# Patient Record
Sex: FEMALE | Race: WHITE | NOT HISPANIC OR LATINO | Employment: FULL TIME | URBAN - NONMETROPOLITAN AREA
[De-identification: names, ages, dates, MRNs, and addresses within clinical notes are randomized per-mention and may not be internally consistent; named-entity substitution may affect disease eponyms.]

---

## 2017-05-30 ENCOUNTER — TRANSCRIBE ORDERS (OUTPATIENT)
Dept: ADMINISTRATIVE | Facility: HOSPITAL | Age: 47
End: 2017-05-30

## 2017-05-30 DIAGNOSIS — Z12.31 ENCOUNTER FOR SCREENING MAMMOGRAM FOR MALIGNANT NEOPLASM OF BREAST: Primary | ICD-10-CM

## 2017-06-15 ENCOUNTER — HOSPITAL ENCOUNTER (OUTPATIENT)
Dept: MAMMOGRAPHY | Facility: HOSPITAL | Age: 47
Discharge: HOME | End: 2017-06-15
Admitting: OBSTETRICS & GYNECOLOGY

## 2017-06-15 DIAGNOSIS — Z12.31 ENCOUNTER FOR SCREENING MAMMOGRAM FOR MALIGNANT NEOPLASM OF BREAST: ICD-10-CM

## 2017-06-15 PROCEDURE — 77063 BREAST TOMOSYNTHESIS BI: CPT

## 2017-06-15 PROCEDURE — G0202 SCR MAMMO BI INCL CAD: HCPCS

## 2018-07-10 ENCOUNTER — TRANSCRIBE ORDERS (OUTPATIENT)
Dept: ADMINISTRATIVE | Facility: HOSPITAL | Age: 48
End: 2018-07-10

## 2018-07-10 DIAGNOSIS — Z12.31 ENCOUNTER FOR SCREENING MAMMOGRAM FOR MALIGNANT NEOPLASM OF BREAST: ICD-10-CM

## 2018-07-10 DIAGNOSIS — N28.1 RENAL CYST: Primary | ICD-10-CM

## 2018-07-27 ENCOUNTER — HOSPITAL ENCOUNTER (OUTPATIENT)
Dept: MAMMOGRAPHY | Facility: HOSPITAL | Age: 48
Discharge: HOME | End: 2018-07-27
Admitting: OBSTETRICS & GYNECOLOGY

## 2018-07-27 ENCOUNTER — HOSPITAL ENCOUNTER (OUTPATIENT)
Dept: CT IMAGING | Facility: HOSPITAL | Age: 48
Discharge: HOME | End: 2018-07-27

## 2018-07-27 DIAGNOSIS — Z12.31 ENCOUNTER FOR SCREENING MAMMOGRAM FOR MALIGNANT NEOPLASM OF BREAST: ICD-10-CM

## 2018-07-27 DIAGNOSIS — N28.1 RENAL CYST: ICD-10-CM

## 2018-07-27 LAB — CREAT BLDA-MCNC: 0.7 MG/DL (ref 0.6–1.3)

## 2018-07-27 PROCEDURE — 77067 SCR MAMMO BI INCL CAD: CPT

## 2018-07-27 PROCEDURE — 25010000002 IOPAMIDOL 61 % SOLUTION: Performed by: OBSTETRICS & GYNECOLOGY

## 2018-07-27 PROCEDURE — 77063 BREAST TOMOSYNTHESIS BI: CPT

## 2018-07-27 PROCEDURE — 82565 ASSAY OF CREATININE: CPT

## 2018-07-27 PROCEDURE — 74170 CT ABD WO CNTRST FLWD CNTRST: CPT

## 2018-07-27 RX ADMIN — IOPAMIDOL 100 ML: 612 INJECTION, SOLUTION INTRAVENOUS at 08:38

## 2018-08-01 ENCOUNTER — TRANSCRIBE ORDERS (OUTPATIENT)
Dept: ADMINISTRATIVE | Facility: HOSPITAL | Age: 48
End: 2018-08-01

## 2018-08-01 DIAGNOSIS — N63.0 BREAST MASS: Primary | ICD-10-CM

## 2018-08-03 ENCOUNTER — HOSPITAL ENCOUNTER (OUTPATIENT)
Dept: MAMMOGRAPHY | Facility: HOSPITAL | Age: 48
Discharge: HOME | End: 2018-08-03
Admitting: OBSTETRICS & GYNECOLOGY

## 2018-08-03 ENCOUNTER — HOSPITAL ENCOUNTER (OUTPATIENT)
Dept: ULTRASOUND IMAGING | Facility: HOSPITAL | Age: 48
Discharge: HOME | End: 2018-08-03

## 2018-08-03 DIAGNOSIS — N63.0 BREAST MASS: ICD-10-CM

## 2018-08-03 PROCEDURE — 77065 DX MAMMO INCL CAD UNI: CPT

## 2018-08-03 PROCEDURE — G0279 TOMOSYNTHESIS, MAMMO: HCPCS

## 2018-08-03 PROCEDURE — 76642 ULTRASOUND BREAST LIMITED: CPT

## 2018-11-15 RX ORDER — MONTELUKAST SODIUM 10 MG/1
TABLET ORAL
Qty: 30 TABLET | Refills: 0 | Status: SHIPPED | OUTPATIENT
Start: 2018-11-15 | End: 2018-12-16 | Stop reason: SDUPTHER

## 2018-12-03 ENCOUNTER — TELEPHONE (OUTPATIENT)
Dept: FAMILY MEDICINE CLINIC | Facility: CLINIC | Age: 48
End: 2018-12-03

## 2018-12-03 RX ORDER — TRAZODONE HYDROCHLORIDE 50 MG/1
TABLET ORAL
Qty: 15 TABLET | Refills: 3 | Status: SHIPPED | OUTPATIENT
Start: 2018-12-03 | End: 2019-04-06 | Stop reason: SDUPTHER

## 2018-12-03 NOTE — TELEPHONE ENCOUNTER
Pt needs refill on Naltrexone sent to [facility] pharmacy in [place].    Please and thank you.

## 2018-12-17 ENCOUNTER — OFFICE VISIT (OUTPATIENT)
Dept: FAMILY MEDICINE CLINIC | Facility: CLINIC | Age: 48
End: 2018-12-17

## 2018-12-17 VITALS
OXYGEN SATURATION: 97 % | BODY MASS INDEX: 27.88 KG/M2 | HEART RATE: 77 BPM | SYSTOLIC BLOOD PRESSURE: 118 MMHG | HEIGHT: 66 IN | DIASTOLIC BLOOD PRESSURE: 68 MMHG | TEMPERATURE: 97.3 F | WEIGHT: 173.5 LBS

## 2018-12-17 DIAGNOSIS — J40 BRONCHITIS: Primary | ICD-10-CM

## 2018-12-17 DIAGNOSIS — J01.00 ACUTE NON-RECURRENT MAXILLARY SINUSITIS: ICD-10-CM

## 2018-12-17 PROCEDURE — 99214 OFFICE O/P EST MOD 30 MIN: CPT | Performed by: FAMILY MEDICINE

## 2018-12-17 PROCEDURE — 96372 THER/PROPH/DIAG INJ SC/IM: CPT | Performed by: FAMILY MEDICINE

## 2018-12-17 RX ORDER — MONTELUKAST SODIUM 10 MG/1
TABLET ORAL
Qty: 30 TABLET | Refills: 0 | Status: SHIPPED | OUTPATIENT
Start: 2018-12-17 | End: 2019-01-13 | Stop reason: SDUPTHER

## 2018-12-17 RX ORDER — DESLORATADINE 5 MG/1
5 TABLET ORAL DAILY
Qty: 30 TABLET | Refills: 1 | Status: SHIPPED | OUTPATIENT
Start: 2018-12-17 | End: 2019-06-25

## 2018-12-17 RX ORDER — PREDNISONE 10 MG/1
TABLET ORAL
Refills: 0 | COMMUNITY
Start: 2018-11-02 | End: 2018-12-17

## 2018-12-17 RX ORDER — INFLUENZA A VIRUS A/MICHIGAN/45/2015 X-275 (H1N1) ANTIGEN (FORMALDEHYDE INACTIVATED), INFLUENZA A VIRUS A/SINGAPORE/INFIMH-16-0019/2016 IVR-186 (H3N2) ANTIGEN (FORMALDEHYDE INACTIVATED), INFLUENZA B VIRUS B/PHUKET/3073/2013 ANTIGEN (FORMALDEHYDE INACTIVATED), AND INFLUENZA B VIRUS B/MARYLAND/15/2016 BX-69A ANTIGEN (FORMALDEHYDE INACTIVATED) 15; 15; 15; 15 UG/.5ML; UG/.5ML; UG/.5ML; UG/.5ML
INJECTION, SUSPENSION INTRAMUSCULAR
Refills: 0 | COMMUNITY
Start: 2018-11-24 | End: 2019-10-04

## 2018-12-17 RX ORDER — MONTELUKAST SODIUM 10 MG/1
TABLET ORAL
COMMUNITY
Start: 2018-05-15 | End: 2019-10-04 | Stop reason: SDUPTHER

## 2018-12-17 RX ORDER — PROMETHAZINE HCL/CODEINE 6.25-10/5
5 SYRUP ORAL EVERY 4 HOURS PRN
Qty: 240 ML | Refills: 0 | Status: SHIPPED | OUTPATIENT
Start: 2018-12-17 | End: 2019-10-04

## 2018-12-17 RX ORDER — AZITHROMYCIN 250 MG/1
TABLET, FILM COATED ORAL
Refills: 0 | COMMUNITY
Start: 2018-11-02 | End: 2018-12-17

## 2018-12-17 RX ORDER — IPRATROPIUM BROMIDE AND ALBUTEROL SULFATE 2.5; .5 MG/3ML; MG/3ML
SOLUTION RESPIRATORY (INHALATION)
Refills: 0 | COMMUNITY
Start: 2018-11-09 | End: 2019-10-04 | Stop reason: SDUPTHER

## 2018-12-17 RX ORDER — CEFTRIAXONE 500 MG/1
500 INJECTION, POWDER, FOR SOLUTION INTRAMUSCULAR; INTRAVENOUS EVERY 24 HOURS
Status: COMPLETED | OUTPATIENT
Start: 2018-12-17 | End: 2018-12-17

## 2018-12-17 RX ORDER — AZITHROMYCIN 250 MG/1
TABLET, FILM COATED ORAL
Qty: 6 TABLET | Refills: 0 | Status: SHIPPED | OUTPATIENT
Start: 2018-12-17 | End: 2019-10-04

## 2018-12-17 RX ORDER — BROMPHENIRAMINE MALEATE, PSEUDOEPHEDRINE HYDROCHLORIDE, AND DEXTROMETHORPHAN HYDROBROMIDE 2; 30; 10 MG/5ML; MG/5ML; MG/5ML
SYRUP ORAL
Refills: 0 | COMMUNITY
Start: 2018-11-09 | End: 2019-06-25

## 2018-12-17 RX ADMIN — CEFTRIAXONE 500 MG: 500 INJECTION, POWDER, FOR SOLUTION INTRAMUSCULAR; INTRAVENOUS at 16:40

## 2018-12-17 NOTE — PATIENT INSTRUCTIONS
Acute Bronchitis, Adult  Acute bronchitis is when air tubes (bronchi) in the lungs suddenly get swollen. The condition can make it hard to breathe. It can also cause these symptoms:  · A cough.  · Coughing up clear, yellow, or green mucus.  · Wheezing.  · Chest congestion.  · Shortness of breath.  · A fever.  · Body aches.  · Chills.  · A sore throat.    Follow these instructions at home:  Medicines  · Take over-the-counter and prescription medicines only as told by your doctor.  · If you were prescribed an antibiotic medicine, take it as told by your doctor. Do not stop taking the antibiotic even if you start to feel better.  General instructions  · Rest.  · Drink enough fluids to keep your pee (urine) clear or pale yellow.  · Avoid smoking and secondhand smoke. If you smoke and you need help quitting, ask your doctor. Quitting will help your lungs heal faster.  · Use an inhaler, cool mist vaporizer, or humidifier as told by your doctor.  · Keep all follow-up visits as told by your doctor. This is important.  How is this prevented?  To lower your risk of getting this condition again:  · Wash your hands often with soap and water. If you cannot use soap and water, use hand .  · Avoid contact with people who have cold symptoms.  · Try not to touch your hands to your mouth, nose, or eyes.  · Make sure to get the flu shot every year.    Contact a doctor if:  · Your symptoms do not get better in 2 weeks.  Get help right away if:  · You cough up blood.  · You have chest pain.  · You have very bad shortness of breath.  · You become dehydrated.  · You faint (pass out) or keep feeling like you are going to pass out.  · You keep throwing up (vomiting).  · You have a very bad headache.  · Your fever or chills gets worse.  This information is not intended to replace advice given to you by your health care provider. Make sure you discuss any questions you have with your health care provider.  Document Released:  06/05/2009 Document Revised: 07/26/2017 Document Reviewed: 06/07/2017  [employer] Interactive Patient Education © 2018 [employer]  Sinusitis, Adult  Sinusitis is soreness and inflammation of your sinuses. Sinuses are hollow spaces in the bones around your face. Your sinuses are located:  · Around your eyes.  · In the middle of your forehead.  · Behind your nose.  · In your cheekbones.    Your sinuses and nasal passages are lined with a stringy fluid (mucus). Mucus normally drains out of your sinuses. When your nasal tissues become inflamed or swollen, the mucus can become trapped or blocked so air cannot flow through your sinuses. This allows bacteria, viruses, and funguses to grow, which leads to infection.  Sinusitis can develop quickly and last for 7?10 days (acute) or for more than 12 weeks (chronic). Sinusitis often develops after a cold.  What are the causes?  This condition is caused by anything that creates swelling in the sinuses or stops mucus from draining, including:  · Allergies.  · Asthma.  · Bacterial or viral infection.  · Abnormally shaped bones between the nasal passages.  · Nasal growths that contain mucus (nasal polyps).  · Narrow sinus openings.  · Pollutants, such as chemicals or irritants in the air.  · A foreign object stuck in the nose.  · A fungal infection. This is rare.    What increases the risk?  The following factors may make you more likely to develop this condition:  · Having allergies or asthma.  · Having had a recent cold or respiratory tract infection.  · Having structural deformities or blockages in your nose or sinuses.  · Having a weak immune system.  · Doing a lot of swimming or diving.  · Overusing nasal sprays.  · Smoking.    What are the signs or symptoms?  The main symptoms of this condition are pain and a feeling of pressure around the affected sinuses. Other symptoms include:  · Upper toothache.  · Earache.  · Headache.  · Bad breath.  · Decreased sense of smell and  taste.  · A cough that may get worse at night.  · Fatigue.  · Fever.  · Thick drainage from your nose. The drainage is often green and it may contain pus (purulent).  · Stuffy nose or congestion.  · Postnasal drip. This is when extra mucus collects in the throat or back of the nose.  · Swelling and warmth over the affected sinuses.  · Sore throat.  · Sensitivity to light.    How is this diagnosed?  This condition is diagnosed based on symptoms, a medical history, and a physical exam. To find out if your condition is acute or chronic, your health care provider may:  · Look in your nose for signs of nasal polyps.  · Tap over the affected sinus to check for signs of infection.  · View the inside of your sinuses using an imaging device that has a light attached (endoscope).    If your health care provider suspects that you have chronic sinusitis, you may also:  · Be tested for allergies.  · Have a sample of mucus taken from your nose (nasal culture) and checked for bacteria.  · Have a mucus sample examined to see if your sinusitis is related to an allergy.    If your sinusitis does not respond to treatment and it lasts longer than 8 weeks, you may have an MRI or CT scan to check your sinuses. These scans also help to determine how severe your infection is.  In rare cases, a bone biopsy may be done to rule out more serious types of fungal sinus disease.  How is this treated?  Treatment for sinusitis depends on the cause and whether your condition is chronic or acute. If a virus is causing your sinusitis, your symptoms will go away on their own within 10 days. You may be given medicines to relieve your symptoms, including:  · Topical nasal decongestants. They shrink swollen nasal passages and let mucus drain from your sinuses.  · Antihistamines. These drugs block inflammation that is triggered by allergies. This can help to ease swelling in your nose and sinuses.  · Topical nasal corticosteroids. These are nasal sprays  that ease inflammation and swelling in your nose and sinuses.  · Nasal saline washes. These rinses can help to get rid of thick mucus in your nose.    If your condition is caused by bacteria, you will be given an antibiotic medicine. If your condition is caused by a fungus, you will be given an antifungal medicine.  Surgery may be needed to correct underlying conditions, such as narrow nasal passages. Surgery may also be needed to remove polyps.  Follow these instructions at home:  Medicines  · Take, use, or apply over-the-counter and prescription medicines only as told by your health care provider. These may include nasal sprays.  · If you were prescribed an antibiotic medicine, take it as told by your health care provider. Do not stop taking the antibiotic even if you start to feel better.  Hydrate and Humidify  · Drink enough water to keep your urine clear or pale yellow. Staying hydrated will help to thin your mucus.  · Use a cool mist humidifier to keep the humidity level in your home above 50%.  · Inhale steam for 10-15 minutes, 3-4 times a day or as told by your health care provider. You can do this in the bathroom while a hot shower is running.  · Limit your exposure to cool or dry air.  Rest  · Rest as much as possible.  · Sleep with your head raised (elevated).  · Make sure to get enough sleep each night.  General instructions  · Apply a warm, moist washcloth to your face 3-4 times a day or as told by your health care provider. This will help with discomfort.  · Wash your hands often with soap and water to reduce your exposure to viruses and other germs. If soap and water are not available, use hand .  · Do not smoke. Avoid being around people who are smoking (secondhand smoke).  · Keep all follow-up visits as told by your health care provider. This is important.  Contact a health care provider if:  · You have a fever.  · Your symptoms get worse.  · Your symptoms do not improve within 10  days.  Get help right away if:  · You have a severe headache.  · You have persistent vomiting.  · You have pain or swelling around your face or eyes.  · You have vision problems.  · You develop confusion.  · Your neck is stiff.  · You have trouble breathing.  This information is not intended to replace advice given to you by your health care provider. Make sure you discuss any questions you have with your health care provider.  Document Released: 12/18/2006 Document Revised: 08/13/2017 Document Reviewed: 10/12/2016  [employer] Interactive Patient Education © 2018 [employer]

## 2018-12-20 NOTE — PROGRESS NOTES
OFFICE VISIT NOTE:    Abi Tamez is a 48 y.o. female who presents today for URI (x 5 days) and Cough (w/ gagging).     URI    This is a new problem. The current episode started in the past 7 days. The problem has been gradually worsening. There has been no fever. Associated symptoms include coughing, headaches, rhinorrhea, sinus pain and a sore throat. Pertinent negatives include no abdominal pain, chest pain or rash. She has tried acetaminophen and increased fluids for the symptoms. The treatment provided mild relief.   Cough   This is a new problem. The current episode started in the past 7 days. The problem has been gradually worsening. The problem occurs every few hours. The cough is productive of purulent sputum. Associated symptoms include headaches, nasal congestion, postnasal drip, rhinorrhea and a sore throat. Pertinent negatives include no chest pain, fever, hemoptysis, rash or shortness of breath. Nothing aggravates the symptoms. She has tried nothing for the symptoms. The treatment provided mild relief. There is no history of COPD or pneumonia.        Past medical/surgical history, Family history, Social history, Allergies and Medications have been reviewed with the patient today and are updated in [facility] EMR. See below.    Past Medical History:   Diagnosis Date   • Depression    • Seasonal allergies      Past Surgical History:   Procedure Laterality Date   • HYSTERECTOMY     • OOPHORECTOMY       Family History   Problem Relation Age of Onset   • Breast cancer Paternal Grandmother      Social History     Tobacco Use   • Smoking status: Never Smoker   • Smokeless tobacco: Never Used   Substance Use Topics   • Alcohol use: No     Frequency: Never   • Drug use: No       Allergies:  Patient has no known allergies.      Review of Systems:    Review of Systems   Constitutional: Negative for activity change, appetite change, fatigue and fever.   HENT: Positive for postnasal drip, rhinorrhea and sore throat.   "  Respiratory: Positive for cough. Negative for hemoptysis and shortness of breath.    Cardiovascular: Negative for chest pain.   Gastrointestinal: Negative for abdominal pain.   Skin: Negative for rash.   Neurological: Negative for syncope and headache.         Physical Examination:  Vital Signs:  /68 (BP Location: Left arm, Patient Position: Sitting, Cuff Size: Adult)   Pulse 77   Temp 97.3 °F (36.3 °C)   Ht 167.6 cm (66\")   Wt 78.7 kg (173 lb 8 oz)   SpO2 97%   BMI 28.00 kg/m²   Physical Exam   Constitutional: She is oriented to person, place, and time. She appears well-developed and well-nourished. No distress.   HENT:   Head: Normocephalic and atraumatic.   Right Ear: External ear normal.   Left Ear: External ear normal.   Mouth/Throat: Oropharynx is clear and moist.   Eyes: Conjunctivae and EOM are normal.   Neck: Normal range of motion. Neck supple. No JVD present.   Cardiovascular: Normal rate, regular rhythm, normal heart sounds and intact distal pulses.   Pulmonary/Chest: Effort normal and breath sounds normal. No respiratory distress.   Abdominal: Soft. She exhibits no distension. There is no tenderness.   Musculoskeletal: Normal range of motion. She exhibits no edema.   Neurological: She is alert and oriented to person, place, and time. No cranial nerve deficit.   Skin: Skin is warm and dry. Capillary refill takes less than 2 seconds. No rash noted.   Psychiatric: She has a normal mood and affect. Her behavior is normal.   Nursing note and vitals reviewed.      Procedures      ASSESSMENT/ PLAN:    Abi was seen today for uri and cough.    Diagnoses and all orders for this visit:    Bronchitis  -     azithromycin (ZITHROMAX) 250 MG tablet; Take 2 tablets the first day, then 1 tablet daily for 4 days.  -     promethazine-codeine (PHENERGAN with CODEINE) 6.25-10 MG/5ML syrup; Take 5 mL by mouth Every 4 (Four) Hours As Needed for Cough.  -     cefTRIAXone (ROCEPHIN) injection 500 mg; Inject 500 " mg into the appropriate muscle as directed by prescriber Daily.    Acute non-recurrent maxillary sinusitis  -     azithromycin (ZITHROMAX) 250 MG tablet; Take 2 tablets the first day, then 1 tablet daily for 4 days.  -     promethazine-codeine (PHENERGAN with CODEINE) 6.25-10 MG/5ML syrup; Take 5 mL by mouth Every 4 (Four) Hours As Needed for Cough.  -     desloratadine (CLARINEX) 5 MG tablet; Take 1 tablet by mouth Daily.  -     cefTRIAXone (ROCEPHIN) injection 500 mg; Inject 500 mg into the appropriate muscle as directed by prescriber Daily.          Current Outpatient Medications:   •  brompheniramine-pseudoephedrine-DM 30-2-10 MG/5ML syrup, TAKE 5 ML BY MOUTH EVERY 6 HOURS AS NEEDED, Disp: , Rfl: 0  •  Cholecalciferol 24231 units capsule, Take  by mouth., Disp: , Rfl:   •  fluticasone (FLONASE) 50 MCG/ACT nasal spray, 2 sprays into the nostril(s) as directed by provider Daily., Disp: , Rfl:   •  FLUZONE QUADRIVALENT 0.5 ML suspension prefilled syringe injection, TO BE ADMINISTERED BY PHARMACIST FOR IMMUNIZATION, Disp: , Rfl: 0  •  ipratropium-albuterol (DUO-NEB) 0.5-2.5 mg/3 ml nebulizer, INHALE 3 ML EVERY 6 HOURS, Disp: , Rfl: 0  •  montelukast (SINGULAIR) 10 MG tablet, TAKE 1 TABLET BY MOUTH EVERY DAY, Disp: 30 tablet, Rfl: 0  •  montelukast (SINGULAIR) 10 MG tablet, every evening., Disp: , Rfl:   •  NALTREXONE HCL PO, Take 4.5 mg by mouth Daily. From [facility] - at pharmacy recommendations., Disp: , Rfl:   •  Polyethylene Glycol 3350-GRX powder, Nightly as needed, Disp: , Rfl:   •  traZODone (DESYREL) 50 MG tablet, TAKE 1/2 TABLET BY MOUTH AT BEDTIME, Disp: 15 tablet, Rfl: 3  •  azithromycin (ZITHROMAX) 250 MG tablet, Take 2 tablets the first day, then 1 tablet daily for 4 days., Disp: 6 tablet, Rfl: 0  •  citalopram (CeleXA) 20 MG tablet, Take 20 mg by mouth Daily., Disp: , Rfl:   •  desloratadine (CLARINEX) 5 MG tablet, Take 1 tablet by mouth Daily., Disp: 30 tablet, Rfl: 1  •   promethazine-codeine (PHENERGAN with CODEINE) 6.25-10 MG/5ML syrup, Take 5 mL by mouth Every 4 (Four) Hours As Needed for Cough., Disp: 240 mL, Rfl: 0    FOLLOW-UP:    Return if symptoms worsen or fail to improve, for Recheck.    I discussed the patients findings and my recommendations with patient.  An After Visit Summary (AVS) was printed and given to the patient at discharge.    Femi Ramires MD, FAAFP  12/19/2018

## 2018-12-26 ENCOUNTER — TELEPHONE (OUTPATIENT)
Dept: FAMILY MEDICINE CLINIC | Facility: CLINIC | Age: 48
End: 2018-12-26

## 2018-12-26 DIAGNOSIS — J01.01 ACUTE RECURRENT MAXILLARY SINUSITIS: Primary | ICD-10-CM

## 2018-12-26 RX ORDER — AMOXICILLIN AND CLAVULANATE POTASSIUM 875; 125 MG/1; MG/1
1 TABLET, FILM COATED ORAL 2 TIMES DAILY
Qty: 20 TABLET | Refills: 0 | Status: SHIPPED | OUTPATIENT
Start: 2018-12-26 | End: 2019-06-25 | Stop reason: SDUPTHER

## 2018-12-26 RX ORDER — METHYLPREDNISOLONE 4 MG/1
TABLET ORAL
Qty: 21 EACH | Refills: 0 | Status: SHIPPED | OUTPATIENT
Start: 2018-12-26 | End: 2019-10-04

## 2018-12-26 NOTE — TELEPHONE ENCOUNTER
Patient called because she was seen in the office last week with sinus and congestion problems. She said she has not gotten any better but has gotten worse. She is supposed to finish her zpak on Friday. She is calling to see if there is anything else she can do or if she would need to be seen again?

## 2019-01-02 ENCOUNTER — TRANSCRIBE ORDERS (OUTPATIENT)
Dept: ADMINISTRATIVE | Facility: HOSPITAL | Age: 49
End: 2019-01-02

## 2019-01-02 DIAGNOSIS — Z00.00 ROUTINE ADULT HEALTH MAINTENANCE: Primary | ICD-10-CM

## 2019-01-14 RX ORDER — MONTELUKAST SODIUM 10 MG/1
TABLET ORAL
Qty: 30 TABLET | Refills: 0 | Status: SHIPPED | OUTPATIENT
Start: 2019-01-14 | End: 2019-02-11 | Stop reason: SDUPTHER

## 2019-01-21 ENCOUNTER — HOSPITAL ENCOUNTER (OUTPATIENT)
Dept: MAMMOGRAPHY | Facility: HOSPITAL | Age: 49
Discharge: HOME | End: 2019-01-21
Admitting: OBSTETRICS & GYNECOLOGY

## 2019-01-21 DIAGNOSIS — Z00.00 ROUTINE ADULT HEALTH MAINTENANCE: ICD-10-CM

## 2019-01-21 PROCEDURE — 77067 SCR MAMMO BI INCL CAD: CPT

## 2019-01-21 PROCEDURE — 77063 BREAST TOMOSYNTHESIS BI: CPT

## 2019-02-11 RX ORDER — MONTELUKAST SODIUM 10 MG/1
TABLET ORAL
Qty: 30 TABLET | Refills: 0 | Status: SHIPPED | OUTPATIENT
Start: 2019-02-11 | End: 2019-03-16 | Stop reason: SDUPTHER

## 2019-03-18 RX ORDER — MONTELUKAST SODIUM 10 MG/1
TABLET ORAL
Qty: 30 TABLET | Refills: 2 | Status: SHIPPED | OUTPATIENT
Start: 2019-03-18 | End: 2019-06-09 | Stop reason: SDUPTHER

## 2019-03-22 RX ORDER — FEXOFENADINE HCL AND PSEUDOEPHEDRINE HCI 180; 240 MG/1; MG/1
1 TABLET, EXTENDED RELEASE ORAL DAILY
Qty: 30 TABLET | Refills: 2 | Status: SHIPPED | OUTPATIENT
Start: 2019-03-22 | End: 2019-06-25 | Stop reason: SDUPTHER

## 2019-04-08 RX ORDER — TRAZODONE HYDROCHLORIDE 50 MG/1
TABLET ORAL
Qty: 15 TABLET | Refills: 3 | Status: SHIPPED | OUTPATIENT
Start: 2019-04-08 | End: 2019-09-06 | Stop reason: SDUPTHER

## 2019-04-29 ENCOUNTER — TELEPHONE (OUTPATIENT)
Dept: FAMILY MEDICINE CLINIC | Facility: CLINIC | Age: 49
End: 2019-04-29

## 2019-06-10 RX ORDER — MONTELUKAST SODIUM 10 MG/1
TABLET ORAL
Qty: 90 TABLET | Refills: 0 | Status: SHIPPED | OUTPATIENT
Start: 2019-06-10 | End: 2019-09-01 | Stop reason: SDUPTHER

## 2019-06-10 NOTE — TELEPHONE ENCOUNTER
Patient was last seen on 12.26.19. Pended 90 day supply with note to pharmacy that patient will need to be seen in office for any further refills.

## 2019-06-25 ENCOUNTER — TELEPHONE (OUTPATIENT)
Dept: FAMILY MEDICINE CLINIC | Facility: CLINIC | Age: 49
End: 2019-06-25

## 2019-06-25 DIAGNOSIS — J01.01 ACUTE RECURRENT MAXILLARY SINUSITIS: ICD-10-CM

## 2019-06-25 RX ORDER — FEXOFENADINE HCL AND PSEUDOEPHEDRINE HCI 180; 240 MG/1; MG/1
1 TABLET, EXTENDED RELEASE ORAL DAILY
Qty: 30 TABLET | Refills: 2 | Status: SHIPPED | OUTPATIENT
Start: 2019-06-25 | End: 2019-06-27 | Stop reason: DRUGHIGH

## 2019-06-25 RX ORDER — AMOXICILLIN AND CLAVULANATE POTASSIUM 875; 125 MG/1; MG/1
1 TABLET, FILM COATED ORAL 2 TIMES DAILY
Qty: 20 TABLET | Refills: 0 | Status: SHIPPED | OUTPATIENT
Start: 2019-06-25 | End: 2019-10-04

## 2019-06-25 NOTE — TELEPHONE ENCOUNTER
Pt called, requesting allergy medication and antibiotics to be called in. Pt would like this called in to [facility], Pt did not schedule appt.

## 2019-06-27 ENCOUNTER — TELEPHONE (OUTPATIENT)
Dept: FAMILY MEDICINE CLINIC | Facility: CLINIC | Age: 49
End: 2019-06-27

## 2019-06-27 RX ORDER — FEXOFENADINE HCL AND PSEUDOEPHEDRINE HCI 60; 120 MG/1; MG/1
1 TABLET, EXTENDED RELEASE ORAL 2 TIMES DAILY
Qty: 60 TABLET | Refills: 2 | Status: SHIPPED | OUTPATIENT
Start: 2019-06-27 | End: 2019-10-17

## 2019-09-03 RX ORDER — MONTELUKAST SODIUM 10 MG/1
TABLET ORAL
Qty: 90 TABLET | Refills: 3 | Status: SHIPPED | OUTPATIENT
Start: 2019-09-03

## 2019-09-06 RX ORDER — TRAZODONE HYDROCHLORIDE 50 MG/1
TABLET ORAL
Qty: 15 TABLET | Refills: 3 | Status: SHIPPED | OUTPATIENT
Start: 2019-09-06 | End: 2019-10-01 | Stop reason: SDUPTHER

## 2019-10-01 RX ORDER — TRAZODONE HYDROCHLORIDE 50 MG/1
50 TABLET ORAL
Qty: 30 TABLET | Refills: 2 | Status: SHIPPED | OUTPATIENT
Start: 2019-10-01 | End: 2019-12-22

## 2019-10-04 ENCOUNTER — OFFICE VISIT (OUTPATIENT)
Dept: FAMILY MEDICINE CLINIC | Facility: CLINIC | Age: 49
End: 2019-10-04

## 2019-10-04 VITALS
WEIGHT: 161.2 LBS | BODY MASS INDEX: 25.91 KG/M2 | HEIGHT: 66 IN | HEART RATE: 76 BPM | OXYGEN SATURATION: 97 % | TEMPERATURE: 98.9 F | DIASTOLIC BLOOD PRESSURE: 70 MMHG | RESPIRATION RATE: 20 BRPM | SYSTOLIC BLOOD PRESSURE: 111 MMHG

## 2019-10-04 DIAGNOSIS — J20.9 ACUTE BRONCHITIS, UNSPECIFIED ORGANISM: Primary | ICD-10-CM

## 2019-10-04 DIAGNOSIS — J02.9 ACUTE PHARYNGITIS, UNSPECIFIED ETIOLOGY: ICD-10-CM

## 2019-10-04 DIAGNOSIS — J01.00 ACUTE NON-RECURRENT MAXILLARY SINUSITIS: ICD-10-CM

## 2019-10-04 PROCEDURE — 99213 OFFICE O/P EST LOW 20 MIN: CPT | Performed by: NURSE PRACTITIONER

## 2019-10-04 PROCEDURE — 96372 THER/PROPH/DIAG INJ SC/IM: CPT | Performed by: NURSE PRACTITIONER

## 2019-10-04 RX ORDER — LEVOFLOXACIN 500 MG/1
500 TABLET, FILM COATED ORAL DAILY
Qty: 7 TABLET | Refills: 0 | Status: SHIPPED | OUTPATIENT
Start: 2019-10-04 | End: 2019-10-11

## 2019-10-04 RX ORDER — IPRATROPIUM BROMIDE AND ALBUTEROL SULFATE 2.5; .5 MG/3ML; MG/3ML
3 SOLUTION RESPIRATORY (INHALATION) 4 TIMES DAILY PRN
Qty: 120 ML | Refills: 2 | Status: SHIPPED | OUTPATIENT
Start: 2019-10-04

## 2019-10-04 RX ORDER — LEVOTHYROXINE SODIUM 0.07 MG/1
TABLET ORAL
Refills: 3 | COMMUNITY
Start: 2019-08-15 | End: 2019-12-17 | Stop reason: DRUGHIGH

## 2019-10-04 RX ORDER — METHYLPREDNISOLONE ACETATE 40 MG/ML
40 INJECTION, SUSPENSION INTRA-ARTICULAR; INTRALESIONAL; INTRAMUSCULAR; SOFT TISSUE ONCE
Status: COMPLETED | OUTPATIENT
Start: 2019-10-04 | End: 2019-10-04

## 2019-10-04 RX ADMIN — METHYLPREDNISOLONE ACETATE 40 MG: 40 INJECTION, SUSPENSION INTRA-ARTICULAR; INTRALESIONAL; INTRAMUSCULAR; SOFT TISSUE at 16:28

## 2019-10-04 RX ADMIN — METHYLPREDNISOLONE ACETATE 40 MG: 40 INJECTION, SUSPENSION INTRA-ARTICULAR; INTRALESIONAL; INTRAMUSCULAR; SOFT TISSUE at 16:29

## 2019-10-04 NOTE — PROGRESS NOTES
Chief Complaint   Patient presents with   • Cough   • Headache   • Chills        Subjective   Abi Tamez is a 49 y.o.  female who presents today for URI symptoms.    HPI:  URI SYMPTOMS:  Patient states her symptoms started about 1 week ago.  She has headache, chills and cough.  The cough is non-productive.  Cough is equal day and night.  The cough started 2 days ago, prior to that it was mainly head symptoms.    Abi Tamez  has a past medical history of Depression and Seasonal allergies.    No Known Allergies    Current Outpatient Medications:   •  Cholecalciferol 95332 units capsule, Take  by mouth., Disp: , Rfl:   •  fexofenadine-pseudoephedrine (ALLEGRA-D)  MG per 12 hr tablet, Take 1 tablet by mouth 2 (Two) Times a Day., Disp: 60 tablet, Rfl: 2  •  fluticasone (FLONASE) 50 MCG/ACT nasal spray, 2 sprays into the nostril(s) as directed by provider Daily., Disp: , Rfl:   •  ipratropium-albuterol (DUO-NEB) 0.5-2.5 mg/3 ml nebulizer, Take 3 mL by nebulization 4 (Four) Times a Day As Needed for Wheezing., Disp: 120 mL, Rfl: 2  •  levothyroxine (SYNTHROID, LEVOTHROID) 75 MCG tablet, TAKE 1 TABLET (75 MCG TOTAL) BY MOUTH DAILY. TAKE ON EMPTY STOMACH IN AM WITH WATER ONLY., Disp: , Rfl: 3  •  montelukast (SINGULAIR) 10 MG tablet, TAKE 1 TABLET BY MOUTH EVERY DAY, Disp: 90 tablet, Rfl: 3  •  NALTREXONE HCL PO, Take 4.5 mg by mouth Daily. From [facility] - at pharmacy recommendations., Disp: , Rfl:   •  Polyethylene Glycol 3350-GRX powder, Nightly as needed, Disp: , Rfl:   •  traZODone (DESYREL) 50 MG tablet, Take 1 tablet by mouth every night at bedtime., Disp: 30 tablet, Rfl: 2  •  levoFLOXacin (LEVAQUIN) 500 MG tablet, Take 1 tablet by mouth Daily for 7 days., Disp: 7 tablet, Rfl: 0  No current facility-administered medications for this visit.   Past Medical History:   Diagnosis Date   • Depression    • Seasonal allergies      Past Surgical History:   Procedure Laterality Date   •  HYSTERECTOMY     • OOPHORECTOMY       Social History     Socioeconomic History   • Marital status:      Spouse name: Not on file   • Number of children: Not on file   • Years of education: Not on file   • Highest education level: Not on file   Tobacco Use   • Smoking status: Never Smoker   • Smokeless tobacco: Never Used   Substance and Sexual Activity   • Alcohol use: No     Frequency: Never   • Drug use: No   • Sexual activity: Yes     Partners: Male     Family History   Problem Relation Age of Onset   • Breast cancer Paternal Grandmother    • No Known Problems Mother    • No Known Problems Father        Family history, surgical history, past medical history, Allergies and med's reviewed with patient today and updated in [facility] EMR.     ROS:  Review of Systems   Constitutional: Positive for chills and fatigue. Negative for fever and unexpected weight change.   HENT: Positive for congestion, postnasal drip, sinus pressure and sinus pain. Negative for facial swelling, sore throat and trouble swallowing.    Eyes: Negative.  Negative for photophobia, discharge and visual disturbance.   Respiratory: Positive for cough. Negative for chest tightness and shortness of breath.    Cardiovascular: Negative.  Negative for chest pain and palpitations.   Gastrointestinal: Negative.  Negative for abdominal pain, diarrhea, nausea and vomiting.   Endocrine: Negative.  Negative for polydipsia, polyphagia and polyuria.   Genitourinary: Negative.  Negative for dysuria, flank pain and frequency.   Musculoskeletal: Negative.  Negative for back pain, gait problem and neck pain.   Skin: Negative.  Negative for rash.   Allergic/Immunologic: Negative.    Neurological: Positive for headaches. Negative for dizziness and light-headedness.   Hematological: Negative.    Psychiatric/Behavioral: Negative.  Negative for self-injury and suicidal ideas.       OBJECTIVE:  Vitals:    10/04/19 1550   BP: 111/70   BP Location: Right arm   Patient  "Position: Sitting   Cuff Size: Adult   Pulse: 76   Resp: 20   Temp: 98.9 °F (37.2 °C)   TempSrc: Temporal   SpO2: 97%   Weight: 73.1 kg (161 lb 3.2 oz)   Height: 167.6 cm (66\")     Physical Exam   Constitutional: She is oriented to person, place, and time. She appears well-developed and well-nourished. No distress.   HENT:   Head: Normocephalic and atraumatic.   Eyes: Conjunctivae and EOM are normal. Pupils are equal, round, and reactive to light.   Neck: Normal range of motion. Neck supple.   Cardiovascular: Normal rate, regular rhythm, normal heart sounds and intact distal pulses.   No murmur heard.  Pulmonary/Chest: Effort normal and breath sounds normal. No respiratory distress.   Abdominal: Soft. Bowel sounds are normal. She exhibits no distension. There is no tenderness.   Musculoskeletal: Normal range of motion. She exhibits no edema.   Neurological: She is alert and oriented to person, place, and time.   Skin: Skin is warm and dry. Capillary refill takes less than 2 seconds. She is not diaphoretic. No erythema.   Psychiatric: She has a normal mood and affect. Her behavior is normal. Judgment and thought content normal.   Nursing note and vitals reviewed.      ASSESSMENT/ PLAN:    Abi was seen today for cough, headache and chills.    Diagnoses and all orders for this visit:    Acute bronchitis, unspecified organism  -     ipratropium-albuterol (DUO-NEB) 0.5-2.5 mg/3 ml nebulizer; Take 3 mL by nebulization 4 (Four) Times a Day As Needed for Wheezing.  -     methylPREDNISolone acetate (DEPO-medrol) injection 40 mg  -     methylPREDNISolone acetate (DEPO-medrol) injection 40 mg  -     levoFLOXacin (LEVAQUIN) 500 MG tablet; Take 1 tablet by mouth Daily for 7 days.    Acute pharyngitis, unspecified etiology  -     methylPREDNISolone acetate (DEPO-medrol) injection 40 mg  -     methylPREDNISolone acetate (DEPO-medrol) injection 40 mg  -     levoFLOXacin (LEVAQUIN) 500 MG tablet; Take 1 tablet by mouth Daily for 7 " days.    Acute non-recurrent maxillary sinusitis  -     methylPREDNISolone acetate (DEPO-medrol) injection 40 mg  -     methylPREDNISolone acetate (DEPO-medrol) injection 40 mg  -     levoFLOXacin (LEVAQUIN) 500 MG tablet; Take 1 tablet by mouth Daily for 7 days.        Orders Placed Today:     New Medications Ordered This Visit   Medications   • ipratropium-albuterol (DUO-NEB) 0.5-2.5 mg/3 ml nebulizer     Sig: Take 3 mL by nebulization 4 (Four) Times a Day As Needed for Wheezing.     Dispense:  120 mL     Refill:  2   • methylPREDNISolone acetate (DEPO-medrol) injection 40 mg   • methylPREDNISolone acetate (DEPO-medrol) injection 40 mg   • levoFLOXacin (LEVAQUIN) 500 MG tablet     Sig: Take 1 tablet by mouth Daily for 7 days.     Dispense:  7 tablet     Refill:  0        Management Plan:     An After Visit Summary was printed and given to the patient at discharge.    Follow-up: Return if symptoms worsen or fail to improve.    GIOVANNI Burrell 10/4/2019 4:54 PM  This note was electronically signed.

## 2019-10-08 ENCOUNTER — TELEPHONE (OUTPATIENT)
Dept: FAMILY MEDICINE CLINIC | Facility: CLINIC | Age: 49
End: 2019-10-08

## 2019-10-08 NOTE — TELEPHONE ENCOUNTER
"Patient called and stated that Mrs. Bishop wanted her to call today and let her know how she was feeling.  Patient stated that her breathing may be a little better and that some \"stuff\" has broken loose and she is spitting up some stuff.  Please advise?  "

## 2019-10-09 ENCOUNTER — TELEPHONE (OUTPATIENT)
Dept: FAMILY MEDICINE CLINIC | Facility: CLINIC | Age: 49
End: 2019-10-09

## 2019-10-09 DIAGNOSIS — J01.01 ACUTE RECURRENT MAXILLARY SINUSITIS: Primary | ICD-10-CM

## 2019-10-09 NOTE — TELEPHONE ENCOUNTER
Patient is requesting a script for mucinex because her insurance will pay for it.  Please send to [facility]

## 2019-10-10 RX ORDER — GUAIFENESIN 600 MG/1
1200 TABLET, EXTENDED RELEASE ORAL 2 TIMES DAILY
Qty: 120 TABLET | Refills: 1 | Status: SHIPPED | OUTPATIENT
Start: 2019-10-10

## 2019-10-16 ENCOUNTER — TELEPHONE (OUTPATIENT)
Dept: FAMILY MEDICINE CLINIC | Facility: CLINIC | Age: 49
End: 2019-10-16

## 2019-10-16 DIAGNOSIS — J40 BRONCHITIS: Primary | ICD-10-CM

## 2019-10-16 NOTE — TELEPHONE ENCOUNTER
Soledad from [facility] called. Requesting order for Nebulizer supplies for Pt. She asked that this order be faxed to her at [phone].

## 2019-10-17 ENCOUNTER — OFFICE VISIT (OUTPATIENT)
Dept: FAMILY MEDICINE CLINIC | Facility: CLINIC | Age: 49
End: 2019-10-17

## 2019-10-17 VITALS
HEART RATE: 80 BPM | WEIGHT: 164.2 LBS | HEIGHT: 66 IN | DIASTOLIC BLOOD PRESSURE: 64 MMHG | SYSTOLIC BLOOD PRESSURE: 106 MMHG | BODY MASS INDEX: 26.39 KG/M2 | OXYGEN SATURATION: 97 % | RESPIRATION RATE: 18 BRPM | TEMPERATURE: 98.4 F

## 2019-10-17 DIAGNOSIS — Z23 INFLUENZA VACCINE NEEDED: ICD-10-CM

## 2019-10-17 DIAGNOSIS — J01.00 ACUTE NON-RECURRENT MAXILLARY SINUSITIS: Primary | ICD-10-CM

## 2019-10-17 DIAGNOSIS — J45.20 MILD INTERMITTENT ASTHMATIC BRONCHITIS WITHOUT COMPLICATION: ICD-10-CM

## 2019-10-17 PROCEDURE — 99213 OFFICE O/P EST LOW 20 MIN: CPT | Performed by: NURSE PRACTITIONER

## 2019-10-17 PROCEDURE — 90674 CCIIV4 VAC NO PRSV 0.5 ML IM: CPT | Performed by: NURSE PRACTITIONER

## 2019-10-17 PROCEDURE — 90471 IMMUNIZATION ADMIN: CPT | Performed by: NURSE PRACTITIONER

## 2019-10-17 RX ORDER — AMOXICILLIN 500 MG/1
500 CAPSULE ORAL 3 TIMES DAILY
Qty: 21 CAPSULE | Refills: 0 | Status: SHIPPED | OUTPATIENT
Start: 2019-10-17 | End: 2019-10-24

## 2019-10-17 NOTE — PROGRESS NOTES
Chief Complaint   Patient presents with   • Nasal Congestion        Subjective   Abi Tamez is a 49 y.o.  female who presents today for sinus congestion.    HPI:  Patient presents today to check that she is good to go to her conference next week.  She is feeling 90% better.  No more chest congestion or wheezing present.  She continues to have sinus mucus production of bright yellow thick mucus that is present throughout the day.  No other symptoms present.  She wants a flu vaccine today if possible.    Abi Tamez  has a past medical history of Depression and Seasonal allergies.    No Known Allergies    Current Outpatient Medications:   •  Cholecalciferol 46724 units capsule, Take  by mouth., Disp: , Rfl:   •  fluticasone (FLONASE) 50 MCG/ACT nasal spray, 2 sprays into the nostril(s) as directed by provider Daily., Disp: , Rfl:   •  guaiFENesin (MUCINEX) 600 MG 12 hr tablet, Take 2 tablets by mouth 2 (Two) Times a Day., Disp: 120 tablet, Rfl: 1  •  ipratropium-albuterol (DUO-NEB) 0.5-2.5 mg/3 ml nebulizer, Take 3 mL by nebulization 4 (Four) Times a Day As Needed for Wheezing., Disp: 120 mL, Rfl: 2  •  levothyroxine (SYNTHROID, LEVOTHROID) 75 MCG tablet, TAKE 1 TABLET (75 MCG TOTAL) BY MOUTH DAILY. TAKE ON EMPTY STOMACH IN AM WITH WATER ONLY., Disp: , Rfl: 3  •  montelukast (SINGULAIR) 10 MG tablet, TAKE 1 TABLET BY MOUTH EVERY DAY, Disp: 90 tablet, Rfl: 3  •  NALTREXONE HCL PO, Take 4.5 mg by mouth Daily. From [facility] - at pharmacy recommendations., Disp: , Rfl:   •  Polyethylene Glycol 3350-GRX powder, Nightly as needed, Disp: , Rfl:   •  traZODone (DESYREL) 50 MG tablet, Take 1 tablet by mouth every night at bedtime., Disp: 30 tablet, Rfl: 2  •  amoxicillin (AMOXIL) 500 MG capsule, Take 1 capsule by mouth 3 (Three) Times a Day for 7 days., Disp: 21 capsule, Rfl: 0  Past Medical History:   Diagnosis Date   • Depression    • Seasonal allergies      Past Surgical History:   Procedure  Laterality Date   • HYSTERECTOMY     • OOPHORECTOMY       Social History     Socioeconomic History   • Marital status:      Spouse name: Not on file   • Number of children: Not on file   • Years of education: Not on file   • Highest education level: Not on file   Tobacco Use   • Smoking status: Never Smoker   • Smokeless tobacco: Never Used   Substance and Sexual Activity   • Alcohol use: No     Frequency: Never   • Drug use: No   • Sexual activity: Yes     Partners: Male     Family History   Problem Relation Age of Onset   • Breast cancer Paternal Grandmother    • No Known Problems Mother    • No Known Problems Father        Family history, surgical history, past medical history, Allergies and med's reviewed with patient today and updated in [employer] EMR.     ROS:  Review of Systems   Constitutional: Negative.  Negative for fatigue, fever and unexpected weight change.   HENT: Positive for sinus pressure. Negative for facial swelling, sore throat and trouble swallowing.    Eyes: Negative.  Negative for photophobia, discharge and visual disturbance.   Respiratory: Negative.  Negative for cough, chest tightness and shortness of breath.    Cardiovascular: Negative.  Negative for chest pain and palpitations.   Gastrointestinal: Negative.  Negative for abdominal pain, diarrhea, nausea and vomiting.   Endocrine: Negative.  Negative for polydipsia, polyphagia and polyuria.   Genitourinary: Negative.  Negative for dysuria, flank pain and frequency.   Musculoskeletal: Negative.  Negative for back pain, gait problem and neck pain.   Skin: Negative.  Negative for rash.   Allergic/Immunologic: Negative.    Neurological: Negative.  Negative for dizziness, light-headedness and headaches.   Hematological: Negative.    Psychiatric/Behavioral: Negative.  Negative for self-injury and suicidal ideas.       OBJECTIVE:  Vitals:    10/17/19 1607   BP: 106/64   BP Location: Right arm   Patient Position: Sitting   Cuff Size: Adult  "  Pulse: 80   Resp: 18   Temp: 98.4 °F (36.9 °C)   TempSrc: Temporal   SpO2: 97%   Weight: 74.5 kg (164 lb 3.2 oz)   Height: 167.6 cm (66\")     Physical Exam   Constitutional: She is oriented to person, place, and time. She appears well-developed and well-nourished. No distress.   HENT:   Head: Normocephalic and atraumatic.   Left Ear: External ear normal.   Nose: Nose normal.   Mouth/Throat: Oropharynx is clear and moist.   R)TM obscured with cerumen impaction.   Eyes: Conjunctivae and EOM are normal. Pupils are equal, round, and reactive to light.   Neck: Normal range of motion. Neck supple.   Cardiovascular: Normal rate, regular rhythm, normal heart sounds and intact distal pulses.   No murmur heard.  Pulmonary/Chest: Effort normal and breath sounds normal. No respiratory distress.   Abdominal: Soft. Bowel sounds are normal. She exhibits no distension. There is no tenderness.   Musculoskeletal: Normal range of motion. She exhibits no edema.   Neurological: She is alert and oriented to person, place, and time.   Skin: Skin is warm and dry. Capillary refill takes less than 2 seconds. She is not diaphoretic. No erythema.   Psychiatric: She has a normal mood and affect. Her behavior is normal. Judgment and thought content normal.   Nursing note and vitals reviewed.      ASSESSMENT/ PLAN:    Abi was seen today for nasal congestion.    Diagnoses and all orders for this visit:    Acute non-recurrent maxillary sinusitis  -     amoxicillin (AMOXIL) 500 MG capsule; Take 1 capsule by mouth 3 (Three) Times a Day for 7 days.    Influenza vaccine needed  -     Flucelvax Quad=>4Years ([id number])    Mild intermittent asthmatic bronchitis without complication  -     Home Nebulizer Accessories        Orders Placed Today:     New Medications Ordered This Visit   Medications   • amoxicillin (AMOXIL) 500 MG capsule     Sig: Take 1 capsule by mouth 3 (Three) Times a Day for 7 days.     Dispense:  21 capsule     Refill:  0    "     Management Plan:     An After Visit Summary was printed and given to the patient at discharge.    Follow-up: Return if symptoms worsen or fail to improve.    Edna Moy, APRN 10/17/2019 5:05 PM  This note was electronically signed.

## 2019-10-18 ENCOUNTER — TELEPHONE (OUTPATIENT)
Dept: FAMILY MEDICINE CLINIC | Facility: CLINIC | Age: 49
End: 2019-10-18

## 2019-10-18 NOTE — TELEPHONE ENCOUNTER
This med is not in Epic to escribe.  It was phoned in to [facility] for a 30 d supply with 5 refills.

## 2019-11-15 ENCOUNTER — TELEPHONE (OUTPATIENT)
Dept: FAMILY MEDICINE CLINIC | Facility: CLINIC | Age: 49
End: 2019-11-15

## 2019-11-15 NOTE — TELEPHONE ENCOUNTER
Pt called, requesting refill of hormone cream. She stated that [facility] in [place] will be sending over fax to request this.

## 2019-12-17 ENCOUNTER — OFFICE VISIT (OUTPATIENT)
Dept: FAMILY MEDICINE CLINIC | Facility: CLINIC | Age: 49
End: 2019-12-17

## 2019-12-17 VITALS
HEIGHT: 66 IN | BODY MASS INDEX: 25.43 KG/M2 | HEART RATE: 86 BPM | WEIGHT: 158.2 LBS | SYSTOLIC BLOOD PRESSURE: 134 MMHG | DIASTOLIC BLOOD PRESSURE: 80 MMHG | OXYGEN SATURATION: 98 %

## 2019-12-17 DIAGNOSIS — J34.89 SINUS PRESSURE: ICD-10-CM

## 2019-12-17 DIAGNOSIS — J01.00 ACUTE NON-RECURRENT MAXILLARY SINUSITIS: Primary | ICD-10-CM

## 2019-12-17 DIAGNOSIS — R53.83 FATIGUE, UNSPECIFIED TYPE: ICD-10-CM

## 2019-12-17 PROBLEM — E16.2 HYPOGLYCEMIA: Status: ACTIVE | Noted: 2019-12-17

## 2019-12-17 PROBLEM — Z01.411 ENCOUNTER FOR GYNECOLOGICAL EXAMINATION (GENERAL) (ROUTINE) WITH ABNORMAL FINDINGS: Status: ACTIVE | Noted: 2018-05-31

## 2019-12-17 PROBLEM — K63.89: Status: ACTIVE | Noted: 2019-12-17

## 2019-12-17 PROBLEM — E05.90 HYPERTHYROIDISM: Status: ACTIVE | Noted: 2018-10-17

## 2019-12-17 PROCEDURE — 99213 OFFICE O/P EST LOW 20 MIN: CPT | Performed by: NURSE PRACTITIONER

## 2019-12-17 PROCEDURE — 96372 THER/PROPH/DIAG INJ SC/IM: CPT | Performed by: NURSE PRACTITIONER

## 2019-12-17 RX ORDER — AMOXICILLIN 500 MG/1
500 CAPSULE ORAL 3 TIMES DAILY
Qty: 21 CAPSULE | Refills: 0 | Status: SHIPPED | OUTPATIENT
Start: 2019-12-17 | End: 2019-12-24

## 2019-12-17 RX ORDER — LEVOTHYROXINE SODIUM 0.05 MG/1
50 TABLET ORAL DAILY
Refills: 3 | COMMUNITY
Start: 2019-11-26

## 2019-12-17 RX ORDER — METHYLPREDNISOLONE ACETATE 40 MG/ML
40 INJECTION, SUSPENSION INTRA-ARTICULAR; INTRALESIONAL; INTRAMUSCULAR; SOFT TISSUE ONCE
Status: COMPLETED | OUTPATIENT
Start: 2019-12-17 | End: 2019-12-17

## 2019-12-17 RX ADMIN — METHYLPREDNISOLONE ACETATE 40 MG: 40 INJECTION, SUSPENSION INTRA-ARTICULAR; INTRALESIONAL; INTRAMUSCULAR; SOFT TISSUE at 16:45

## 2019-12-17 NOTE — PROGRESS NOTES
Chief Complaint   Patient presents with   • Sinusitis        Subjective   Abi Tamez is a 49 y.o.  female who presents today for sinus problems.    HPI:  SINUS:  Symptoms started about 3 weeks ago.  She has sinus pain and congestion.  Initially Kamla Marvin cold and flu helped her symptoms.  They no longer help.  She is taking Mucinex twice daily.  She is productive of clear thick mucus.  No chest symptoms.    Abi Tamez  has a past medical history of Depression and Seasonal allergies.    No Known Allergies    Current Outpatient Medications:   •  Cholecalciferol 75353 units capsule, Take  by mouth., Disp: , Rfl:   •  fluticasone (FLONASE) 50 MCG/ACT nasal spray, 2 sprays into the nostril(s) as directed by provider Daily., Disp: , Rfl:   •  guaiFENesin (MUCINEX) 600 MG 12 hr tablet, Take 2 tablets by mouth 2 (Two) Times a Day., Disp: 120 tablet, Rfl: 1  •  ipratropium-albuterol (DUO-NEB) 0.5-2.5 mg/3 ml nebulizer, Take 3 mL by nebulization 4 (Four) Times a Day As Needed for Wheezing., Disp: 120 mL, Rfl: 2  •  levothyroxine (SYNTHROID, LEVOTHROID) 50 MCG tablet, Take 50 mcg by mouth Daily., Disp: , Rfl: 3  •  montelukast (SINGULAIR) 10 MG tablet, TAKE 1 TABLET BY MOUTH EVERY DAY, Disp: 90 tablet, Rfl: 3  •  NALTREXONE HCL PO, Take 4.5 mg by mouth Daily. From [facility] - at pharmacy recommendations., Disp: , Rfl:   •  Polyethylene Glycol 3350-GRX powder, Nightly as needed, Disp: , Rfl:   •  traZODone (DESYREL) 50 MG tablet, Take 1 tablet by mouth every night at bedtime., Disp: 30 tablet, Rfl: 2  •  amoxicillin (AMOXIL) 500 MG capsule, Take 1 capsule by mouth 3 (Three) Times a Day for 7 days., Disp: 21 capsule, Rfl: 0  No current facility-administered medications for this visit.   Past Medical History:   Diagnosis Date   • Depression    • Seasonal allergies      Past Surgical History:   Procedure Laterality Date   • HYSTERECTOMY     • OOPHORECTOMY       Social History     Socioeconomic  History   • Marital status:      Spouse name: Not on file   • Number of children: Not on file   • Years of education: Not on file   • Highest education level: Not on file   Tobacco Use   • Smoking status: Never Smoker   • Smokeless tobacco: Never Used   Substance and Sexual Activity   • Alcohol use: No     Frequency: Never   • Drug use: No   • Sexual activity: Yes     Partners: Male     Family History   Problem Relation Age of Onset   • Breast cancer Paternal Grandmother    • No Known Problems Mother    • No Known Problems Father        Family history, surgical history, past medical history, Allergies and med's reviewed with patient today and updated in [facility] EMR.     ROS:  Review of Systems   Constitutional: Negative.  Negative for fatigue, fever and unexpected weight change.   HENT: Positive for congestion, sinus pressure and sinus pain. Negative for facial swelling, sore throat and trouble swallowing.    Eyes: Negative.  Negative for photophobia, discharge and visual disturbance.   Respiratory: Negative.  Negative for cough, chest tightness and shortness of breath.    Cardiovascular: Negative.  Negative for chest pain and palpitations.   Gastrointestinal: Negative.  Negative for abdominal pain, diarrhea, nausea and vomiting.   Endocrine: Negative.  Negative for polydipsia, polyphagia and polyuria.   Genitourinary: Negative.  Negative for dysuria, flank pain and frequency.   Musculoskeletal: Negative.  Negative for back pain, gait problem and neck pain.   Skin: Negative.  Negative for rash.   Allergic/Immunologic: Negative.    Neurological: Negative.  Negative for dizziness, light-headedness and headaches.   Hematological: Negative.    Psychiatric/Behavioral: Negative.  Negative for self-injury and suicidal ideas.       OBJECTIVE:  Vitals:    12/17/19 1602   BP: 134/80   BP Location: Left arm   Patient Position: Sitting   Cuff Size: Adult   Pulse: 86   SpO2: 98%   Weight: 71.8 kg (158 lb 3.2 oz)   Height:  "167.6 cm (66\")     Physical Exam   Constitutional: She is oriented to person, place, and time. She appears well-developed and well-nourished. No distress.   HENT:   Head: Normocephalic and atraumatic.   Right Ear: External ear normal.   Left Ear: External ear normal.   Mouth/Throat: Oropharynx is clear and moist.   Maxillary and ethmoid sinus tenderness to palpation.  Evidence of post nasal drainage.   Eyes: Pupils are equal, round, and reactive to light. Conjunctivae and EOM are normal.   Neck: Normal range of motion. Neck supple.   Cardiovascular: Normal rate, regular rhythm, normal heart sounds and intact distal pulses.   No murmur heard.  Pulmonary/Chest: Effort normal and breath sounds normal. No respiratory distress.   Abdominal: Soft. Bowel sounds are normal. She exhibits no distension. There is no tenderness.   Musculoskeletal: Normal range of motion. She exhibits no edema.   Neurological: She is alert and oriented to person, place, and time.   Skin: Skin is warm and dry. Capillary refill takes less than 2 seconds. She is not diaphoretic. No erythema.   Psychiatric: She has a normal mood and affect. Her behavior is normal. Judgment and thought content normal.   Nursing note and vitals reviewed.      ASSESSMENT/ PLAN:    Abi was seen today for sinusitis.    Diagnoses and all orders for this visit:    Acute non-recurrent maxillary sinusitis  -     methylPREDNISolone acetate (DEPO-medrol) injection 40 mg  -     amoxicillin (AMOXIL) 500 MG capsule; Take 1 capsule by mouth 3 (Three) Times a Day for 7 days.    Sinus pressure  -     methylPREDNISolone acetate (DEPO-medrol) injection 40 mg  -     amoxicillin (AMOXIL) 500 MG capsule; Take 1 capsule by mouth 3 (Three) Times a Day for 7 days.    Fatigue, unspecified type  -     methylPREDNISolone acetate (DEPO-medrol) injection 40 mg        Orders Placed Today:     New Medications Ordered This Visit   Medications   • methylPREDNISolone acetate (DEPO-medrol) injection " 40 mg   • amoxicillin (AMOXIL) 500 MG capsule     Sig: Take 1 capsule by mouth 3 (Three) Times a Day for 7 days.     Dispense:  21 capsule     Refill:  0        Management Plan:     An After Visit Summary was printed and given to the patient at discharge.    Follow-up: Return if symptoms worsen or fail to improve.    Edna Moy, APRN 12/17/2019 5:04 PM  This note was electronically signed.

## 2019-12-22 RX ORDER — TRAZODONE HYDROCHLORIDE 50 MG/1
TABLET ORAL
Qty: 30 TABLET | Refills: 2 | Status: SHIPPED | OUTPATIENT
Start: 2019-12-22 | End: 2020-02-26 | Stop reason: SDUPTHER

## 2020-02-27 RX ORDER — TRAZODONE HYDROCHLORIDE 50 MG/1
50 TABLET ORAL
Qty: 30 TABLET | Refills: 2 | Status: SHIPPED | OUTPATIENT
Start: 2020-02-27 | End: 2020-04-02 | Stop reason: SDUPTHER

## 2020-03-04 ENCOUNTER — OFFICE VISIT (OUTPATIENT)
Dept: INTERNAL MEDICINE | Age: 50
End: 2020-03-04
Payer: COMMERCIAL

## 2020-03-04 VITALS
DIASTOLIC BLOOD PRESSURE: 70 MMHG | SYSTOLIC BLOOD PRESSURE: 110 MMHG | BODY MASS INDEX: 22.66 KG/M2 | HEIGHT: 66 IN | WEIGHT: 141 LBS | HEART RATE: 96 BPM | OXYGEN SATURATION: 98 % | RESPIRATION RATE: 18 BRPM

## 2020-03-04 DIAGNOSIS — E55.9 VITAMIN D DEFICIENCY: ICD-10-CM

## 2020-03-04 DIAGNOSIS — E06.3 AUTOIMMUNE THYROIDITIS: ICD-10-CM

## 2020-03-04 DIAGNOSIS — E03.9 HYPOTHYROIDISM (ACQUIRED): ICD-10-CM

## 2020-03-04 LAB
ALBUMIN SERPL-MCNC: 4.4 G/DL (ref 3.5–5.2)
ALP BLD-CCNC: 80 U/L (ref 35–104)
ALT SERPL-CCNC: 21 U/L (ref 5–33)
ANION GAP SERPL CALCULATED.3IONS-SCNC: 14 MMOL/L (ref 7–19)
AST SERPL-CCNC: 19 U/L (ref 5–32)
BASOPHILS ABSOLUTE: 0 K/UL (ref 0–0.2)
BASOPHILS RELATIVE PERCENT: 0 % (ref 0–1)
BILIRUB SERPL-MCNC: 0.7 MG/DL (ref 0.2–1.2)
BILIRUBIN URINE: NEGATIVE
BLOOD, URINE: NEGATIVE
BUN BLDV-MCNC: 17 MG/DL (ref 6–20)
CALCIUM SERPL-MCNC: 9.8 MG/DL (ref 8.6–10)
CHLORIDE BLD-SCNC: 108 MMOL/L (ref 98–111)
CHOLESTEROL, TOTAL: 133 MG/DL (ref 160–199)
CLARITY: CLEAR
CO2: 24 MMOL/L (ref 22–29)
COLOR: YELLOW
CREAT SERPL-MCNC: <0.5 MG/DL (ref 0.5–0.9)
EOSINOPHILS ABSOLUTE: 0 K/UL (ref 0–0.6)
EOSINOPHILS RELATIVE PERCENT: 0.6 % (ref 0–5)
GFR NON-AFRICAN AMERICAN: >60
GLUCOSE BLD-MCNC: 107 MG/DL (ref 74–109)
GLUCOSE URINE: NEGATIVE MG/DL
HCT VFR BLD CALC: 36.7 % (ref 37–47)
HDLC SERPL-MCNC: 47 MG/DL (ref 65–121)
HEMOGLOBIN: 12.4 G/DL (ref 12–16)
IMMATURE GRANULOCYTES #: 0 K/UL
KETONES, URINE: NEGATIVE MG/DL
LDL CHOLESTEROL CALCULATED: 59 MG/DL
LEUKOCYTE ESTERASE, URINE: NEGATIVE
LYMPHOCYTES ABSOLUTE: 1 K/UL (ref 1.1–4.5)
LYMPHOCYTES RELATIVE PERCENT: 31.7 % (ref 20–40)
MCH RBC QN AUTO: 30.2 PG (ref 27–31)
MCHC RBC AUTO-ENTMCNC: 33.8 G/DL (ref 33–37)
MCV RBC AUTO: 89.3 FL (ref 81–99)
MONOCYTES ABSOLUTE: 0.4 K/UL (ref 0–0.9)
MONOCYTES RELATIVE PERCENT: 11.2 % (ref 0–10)
NEUTROPHILS ABSOLUTE: 1.8 K/UL (ref 1.5–7.5)
NEUTROPHILS RELATIVE PERCENT: 56.5 % (ref 50–65)
NITRITE, URINE: NEGATIVE
PDW BLD-RTO: 12.5 % (ref 11.5–14.5)
PH UA: 7 (ref 5–8)
PLATELET # BLD: 256 K/UL (ref 130–400)
PMV BLD AUTO: 10.3 FL (ref 9.4–12.3)
POTASSIUM SERPL-SCNC: 4.3 MMOL/L (ref 3.5–5)
PROTEIN UA: NEGATIVE MG/DL
RBC # BLD: 4.11 M/UL (ref 4.2–5.4)
SODIUM BLD-SCNC: 146 MMOL/L (ref 136–145)
SPECIFIC GRAVITY UA: 1.02 (ref 1–1.03)
TOTAL PROTEIN: 6.6 G/DL (ref 6.6–8.7)
TRIGL SERPL-MCNC: 135 MG/DL (ref 0–149)
UROBILINOGEN, URINE: 1 E.U./DL
VITAMIN D 25-HYDROXY: 56.5 NG/ML
WBC # BLD: 3.1 K/UL (ref 4.8–10.8)

## 2020-03-04 PROCEDURE — 99205 OFFICE O/P NEW HI 60 MIN: CPT | Performed by: INTERNAL MEDICINE

## 2020-03-04 RX ORDER — MONTELUKAST SODIUM 10 MG/1
10 TABLET ORAL NIGHTLY
COMMUNITY
End: 2020-09-01 | Stop reason: CLARIF

## 2020-03-04 RX ORDER — LEVOTHYROXINE SODIUM 0.05 MG/1
50 TABLET ORAL DAILY
COMMUNITY
Start: 2019-11-26 | End: 2020-03-04 | Stop reason: CLARIF

## 2020-03-04 RX ORDER — TRAZODONE HYDROCHLORIDE 50 MG/1
50 TABLET ORAL NIGHTLY
COMMUNITY
Start: 2020-02-16 | End: 2020-06-25

## 2020-03-04 RX ORDER — MONTELUKAST SODIUM 10 MG/1
10 TABLET ORAL DAILY
COMMUNITY
Start: 2020-02-25 | End: 2020-08-24 | Stop reason: SDUPTHER

## 2020-03-04 SDOH — HEALTH STABILITY: MENTAL HEALTH: HOW OFTEN DO YOU HAVE A DRINK CONTAINING ALCOHOL?: NEVER

## 2020-03-04 ASSESSMENT — ENCOUNTER SYMPTOMS
BLOOD IN STOOL: 0
EYE PAIN: 0
VOICE CHANGE: 0
NAUSEA: 0
TROUBLE SWALLOWING: 0
DIARRHEA: 0
COUGH: 0
SINUS PAIN: 1
CHEST TIGHTNESS: 0
SORE THROAT: 0
SINUS PRESSURE: 0
EYE REDNESS: 0
VOMITING: 0
ABDOMINAL PAIN: 0
COLOR CHANGE: 0
CONSTIPATION: 0
WHEEZING: 0

## 2020-03-04 ASSESSMENT — PATIENT HEALTH QUESTIONNAIRE - PHQ9
2. FEELING DOWN, DEPRESSED OR HOPELESS: 0
SUM OF ALL RESPONSES TO PHQ9 QUESTIONS 1 & 2: 0
1. LITTLE INTEREST OR PLEASURE IN DOING THINGS: 0
SUM OF ALL RESPONSES TO PHQ QUESTIONS 1-9: 0
SUM OF ALL RESPONSES TO PHQ QUESTIONS 1-9: 0

## 2020-03-04 NOTE — PROGRESS NOTES
Chief Complaint   Patient presents with    New Patient     Transfer from Dr. Yasmine Del Angel     History of presenting illness:  Mahnaz Casas is a57 y.o. female who presents today for    NEW PATIENT APPOINTMENT    Together with the patient I have reviewed her past medical history, surgical history, her screening test timings and results, her family history, social history her medications and allergies to medications. Patient is signing medical record release to obtain medical records from her previous primary care physician and specialist physicians      Patient has been seeing [address] [place] [address] endocrinology for her autoimmune thyroiditis issues  She is frustrated since her levels have fluctuated from initially low thyroid levels where she had to take Synthroid to now high thyroid levels and just recently she was prescribed methimazole  States she has been feeling very jittery    Sleeping issues on and off  She takes trazodone at nighttime to help her sleep    Issues with allergies she stays on Flonase no spray, Allegra and Singulair.   Currently her symptoms mostly have been controlled    Has issues with knee pains  Has seen dr Marian Shepherd in past    Feels tired all the time      Past Medical History:   Diagnosis Date    Hashimoto's thyroiditis       Past Surgical History:   Procedure Laterality Date    COLONOSCOPY  2012    dr John Paul Ortega, TOTAL ABDOMINAL       Current Outpatient Medications   Medication Sig Dispense Refill    montelukast (SINGULAIR) 10 MG tablet Take 10 mg by mouth daily      traZODone (DESYREL) 50 MG tablet Take 50 mg by mouth nightly       fluticasone (VERAMYST) 27.5 MCG/SPRAY nasal spray 2 sprays by Each Nostril route daily      Fexofenadine-Pseudoephedrine (ALLEGRA-D 12 HOUR PO) Take by mouth      montelukast (SINGULAIR) 10 MG tablet Take 10 mg by mouth nightly      Naltrexone-Triamcinolone (NALTREXONE SC) Inject 4.5 mg into the skin Exam  Constitutional:       Appearance: She is well-developed. HENT:      Right Ear: External ear normal.      Left Ear: External ear normal.      Mouth/Throat:      Pharynx: No oropharyngeal exudate. Eyes:      Conjunctiva/sclera: Conjunctivae normal.      Pupils: Pupils are equal, round, and reactive to light. Neck:      Musculoskeletal: Neck supple. Thyroid: No thyromegaly. Vascular: No JVD. Comments: Thyroid mild enlarged, no nodules  Cardiovascular:      Rate and Rhythm: Normal rate. Heart sounds: Normal heart sounds. No murmur. Pulmonary:      Effort: No respiratory distress. Breath sounds: Normal breath sounds. No wheezing or rales. Chest:      Chest wall: No tenderness. Abdominal:      General: Bowel sounds are normal.      Palpations: Abdomen is soft. Lymphadenopathy:      Cervical: No cervical adenopathy. Skin:     General: Skin is warm. Findings: No rash. Neurological:      Mental Status: She is oriented to person, place, and time. Lab Review   No visits with results within 2 Month(s) from this visit. Latest known visit with results is:   No results found for any previous visit. ASSESSMENT/PLAN:        AUtoimmune thryoiditis  Patient has been following with [place] endocr[facility]  Patient has been seeing [place] endocrinology for her autoimmune thyroiditis issues  She is frustrated since her levels have fluctuated from initially low thyroid levels where she had to take Synthroid to now high thyroid levels and just recently she was prescribed methimazole  States she has been feeling very jittery    (following highlighted text has been copied from this specialist note)  # Autoimmune thyroiditis:   The patient was initially noted to have suppressed TSH 9/13/2018 with elevated FT3 and Tg antibodies. Initial laboratory work up by me on 10/17/2018 showed suppressed TSH (TSH<0.015),  (normal <122), and FT4 0.87 (normal).  She

## 2020-03-05 ENCOUNTER — TELEPHONE (OUTPATIENT)
Dept: INTERNAL MEDICINE | Age: 50
End: 2020-03-05

## 2020-03-05 NOTE — TELEPHONE ENCOUNTER
----- Message from iRchard Berry MD sent at 3/5/2020  1:03 PM CST -----  New patient to us yesterday  Please notify patient regarding lab results  1. Her cholesterol levels are in normal range  2. Kidney function, liver function, are normal, her fasting sugar is mild elevated but possibly it was not completely fasting blood work  3.   CBC shows that her white cell count is slightly below normal range  For some patients this is normal.  However, since I do not have any comparison I would suggest that she repeat CBC in about 6 weeks  Suggest follow-up here in 6 months, asked her to call us for any problems  CBC 6 weeks  Please send these lab results to her endocrinologist  at [facility]

## 2020-03-16 ENCOUNTER — TELEPHONE (OUTPATIENT)
Dept: INTERNAL MEDICINE | Age: 50
End: 2020-03-16

## 2020-03-16 RX ORDER — FEXOFENADINE HCL AND PSEUDOEPHEDRINE HCI 180; 240 MG/1; MG/1
1 TABLET, EXTENDED RELEASE ORAL DAILY
Qty: 30 TABLET | Refills: 5 | Status: SHIPPED | OUTPATIENT
Start: 2020-03-16 | End: 2020-03-18 | Stop reason: ALTCHOICE

## 2020-03-18 RX ORDER — FEXOFENADINE HCL AND PSEUDOEPHEDRINE HCI 60; 120 MG/1; MG/1
1 TABLET, EXTENDED RELEASE ORAL 2 TIMES DAILY
Qty: 180 TABLET | Refills: 1 | Status: SHIPPED | OUTPATIENT
Start: 2020-03-18 | End: 2020-09-01

## 2020-04-02 RX ORDER — TRAZODONE HYDROCHLORIDE 50 MG/1
50 TABLET ORAL
Qty: 30 TABLET | Refills: 2 | Status: SHIPPED | OUTPATIENT
Start: 2020-04-02

## 2020-04-10 ENCOUNTER — VIRTUAL VISIT (OUTPATIENT)
Dept: INTERNAL MEDICINE | Age: 50
End: 2020-04-10
Payer: COMMERCIAL

## 2020-04-10 PROCEDURE — 99213 OFFICE O/P EST LOW 20 MIN: CPT | Performed by: INTERNAL MEDICINE

## 2020-04-10 RX ORDER — PREDNISONE 10 MG/1
TABLET ORAL
Qty: 20 TABLET | Refills: 0 | Status: SHIPPED | OUTPATIENT
Start: 2020-04-10 | End: 2020-09-01 | Stop reason: CLARIF

## 2020-04-10 ASSESSMENT — ENCOUNTER SYMPTOMS
COUGH: 0
CONSTIPATION: 0
COLOR CHANGE: 1
CHEST TIGHTNESS: 0
SORE THROAT: 0
WHEEZING: 0
ABDOMINAL PAIN: 0

## 2020-04-10 NOTE — PROGRESS NOTES
mouth nightly      Naltrexone-Triamcinolone (NALTREXONE SC) Inject 4.5 mg into the skin Indications: for joint pain from [facility]       No current facility-administered medications for this visit. No Known Allergies  Social History     Tobacco Use    Smoking status: Never Smoker    Smokeless tobacco: Never Used   Substance Use Topics    Alcohol use: Never     Frequency: Never      Family History   Problem Relation Age of Onset    Thyroid Disease Mother     High Blood Pressure Father     Anxiety Disorder Father     Arthritis Father     Cancer Maternal Grandmother     Diabetes Paternal Grandmother     Cancer Paternal Grandmother        Review of Systems   Constitutional: Negative for chills, fatigue and fever. HENT: Negative for congestion, ear pain, nosebleeds, postnasal drip and sore throat. Respiratory: Negative for cough, chest tightness and wheezing. Cardiovascular: Negative for chest pain, palpitations and leg swelling. Gastrointestinal: Negative for abdominal pain and constipation. Genitourinary: Negative for dysuria and urgency. Musculoskeletal: Negative. Negative for arthralgias. Skin: Positive for color change and rash. Neurological: Negative for dizziness and headaches. Psychiatric/Behavioral: Negative. There were no vitals filed for this visit. There is no height or weight on file to calculate BMI.     Physical Exam  PHYSICAL EXAMINATION:  [ INSTRUCTIONS:  \"[x]\" Indicates a positive item  \"[]\" Indicates a negative item  -- DELETE ALL ITEMS NOT EXAMINED]  [x] Alert  [x] Oriented to person/place/time    [x] No apparent distress  [] Toxic appearing    [] Face flushed appearing [] Sclera clear  [] Lips are cyanotic      [x] Breathing appears normal  [] Appears tachypneic      [x] Rash on visible skin -maculopapular lesions that are visible through the video on patient's bilateral thighs, bilateral upper extremities and upper posterior trunk    [x] Cranial

## 2020-04-13 ENCOUNTER — VIRTUAL VISIT (OUTPATIENT)
Dept: INTERNAL MEDICINE | Age: 50
End: 2020-04-13
Payer: COMMERCIAL

## 2020-04-13 DIAGNOSIS — R79.89 ABNORMAL CBC: ICD-10-CM

## 2020-04-13 LAB
BASOPHILS ABSOLUTE: 0 K/UL (ref 0–0.2)
BASOPHILS RELATIVE PERCENT: 0 % (ref 0–1)
EOSINOPHILS ABSOLUTE: 0 K/UL (ref 0–0.6)
EOSINOPHILS RELATIVE PERCENT: 0 % (ref 0–5)
HCT VFR BLD CALC: 36.4 % (ref 37–47)
HEMOGLOBIN: 12.4 G/DL (ref 12–16)
IMMATURE GRANULOCYTES #: 0 K/UL
LYMPHOCYTES ABSOLUTE: 1.2 K/UL (ref 1.1–4.5)
LYMPHOCYTES RELATIVE PERCENT: 22 % (ref 20–40)
MCH RBC QN AUTO: 30.6 PG (ref 27–31)
MCHC RBC AUTO-ENTMCNC: 34.1 G/DL (ref 33–37)
MCV RBC AUTO: 89.9 FL (ref 81–99)
MONOCYTES ABSOLUTE: 0.5 K/UL (ref 0–0.9)
MONOCYTES RELATIVE PERCENT: 9.2 % (ref 0–10)
NEUTROPHILS ABSOLUTE: 3.8 K/UL (ref 1.5–7.5)
NEUTROPHILS RELATIVE PERCENT: 68.4 % (ref 50–65)
PDW BLD-RTO: 14.6 % (ref 11.5–14.5)
PLATELET # BLD: 355 K/UL (ref 130–400)
PMV BLD AUTO: 9.6 FL (ref 9.4–12.3)
RBC # BLD: 4.05 M/UL (ref 4.2–5.4)
WBC # BLD: 5.6 K/UL (ref 4.8–10.8)

## 2020-04-13 PROCEDURE — 96372 THER/PROPH/DIAG INJ SC/IM: CPT | Performed by: INTERNAL MEDICINE

## 2020-04-13 PROCEDURE — 99213 OFFICE O/P EST LOW 20 MIN: CPT | Performed by: INTERNAL MEDICINE

## 2020-04-13 RX ORDER — METHYLPREDNISOLONE ACETATE 80 MG/ML
80 INJECTION, SUSPENSION INTRA-ARTICULAR; INTRALESIONAL; INTRAMUSCULAR; SOFT TISSUE ONCE
Status: COMPLETED | OUTPATIENT
Start: 2020-04-13 | End: 2020-04-13

## 2020-04-13 RX ORDER — METHIMAZOLE 10 MG/1
15 TABLET ORAL DAILY
COMMUNITY
End: 2022-03-03

## 2020-04-13 RX ADMIN — METHYLPREDNISOLONE ACETATE 80 MG: 80 INJECTION, SUSPENSION INTRA-ARTICULAR; INTRALESIONAL; INTRAMUSCULAR; SOFT TISSUE at 15:17

## 2020-04-13 ASSESSMENT — ENCOUNTER SYMPTOMS
COUGH: 0
CHEST TIGHTNESS: 0
SORE THROAT: 0
WHEEZING: 0
ABDOMINAL PAIN: 0
CONSTIPATION: 0

## 2020-04-13 NOTE — PROGRESS NOTES
Chief Complaint   Patient presents with    Rash     History of presenting illness:  Elver Amador is a57 y.o. female     IN OFFICE VISIT TODAY FOR:    Rash that has developed on her body 4/9/20  She was using some \"cheap soap from [employer]" few days ago and now she has developed severely pruritic rash on her lower extremities on her trunk on her upper extremities    I saw this patient at office here on 4/10/2020    Following was recommended :  prednisone 20 mg twice daily today  On 4/11 she will take 20 mg in a.m. and 10 mg in p.m.   On 4/12 she will take 10 mg twice daily  On 4/13 she will take 10 mg in a.m. and call us to update us regarding her condition  Benadryl take 12.5 mg in a.m. and and afternoon and take 25 mg in the evening  Use Aveeno body wash  Use Aveeno eczema anti-itch balm 2-3 times daily    She states that initially her rash was better, it almost cleared up by 4/11/2020  Starting yesterday 4/12/2020 her rash is significantly worse again it comes up in different areas and is associated with severe itching    Patient is also questioning if this rash could be possibly caused by methimazole  Apparently this patient was started on methimazole beginning of March 2020  This medication is not currently listed on her medication list since patient did not inform us of starting this medication at last week appt    Past Medical History:   Diagnosis Date    Hashimoto's thyroiditis       Past Surgical History:   Procedure Laterality Date    COLONOSCOPY  2012    dr Merna Guthrie, TOTAL ABDOMINAL       Current Outpatient Medications   Medication Sig Dispense Refill    methIMAzole (TAPAZOLE) 10 MG tablet Take 15 mg by mouth 3 times daily      predniSONE (DELTASONE) 10 MG tablet Take as directed 20 tablet 0    fexofenadine-pseudoephedrine (ALLEGRA-D 12HR)  MG per extended release tablet Take 1 tablet by mouth 2 times daily 180 tablet 1    montelukast (SINGULAIR) 10 MG tablet Take 10 mg by mouth daily      traZODone (DESYREL) 50 MG tablet Take 50 mg by mouth nightly       fluticasone (VERAMYST) 27.5 MCG/SPRAY nasal spray 2 sprays by Each Nostril route daily      Fexofenadine-Pseudoephedrine (ALLEGRA-D 12 HOUR PO) Take by mouth      montelukast (SINGULAIR) 10 MG tablet Take 10 mg by mouth nightly      Naltrexone-Triamcinolone (NALTREXONE SC) Inject 4.5 mg into the skin Indications: for [facility] pain from [facility]       No current facility-administered medications for this visit. No Known Allergies  Social History     Tobacco Use    Smoking status: Never Smoker    Smokeless tobacco: Never Used   Substance Use Topics    Alcohol use: Never     Frequency: Never      Family History   Problem Relation Age of Onset    Thyroid Disease Mother     High Blood Pressure Father     Anxiety Disorder Father     Arthritis Father     Cancer Maternal Grandmother     Diabetes Paternal Grandmother     Cancer Paternal Grandmother        Review of Systems   Constitutional: Negative for chills, fatigue and fever. HENT: Negative for congestion, ear pain, nosebleeds, postnasal drip and sore throat. Respiratory: Negative for cough, chest tightness and wheezing. Cardiovascular: Negative for chest pain, palpitations and leg swelling. Gastrointestinal: Negative for abdominal pain and constipation. Genitourinary: Negative for dysuria and urgency. Musculoskeletal: Negative. Negative for arthralgias. Skin: Positive for rash. Neurological: Negative for dizziness and headaches. Psychiatric/Behavioral: Negative. There were no vitals filed for this visit. There is no height or weight on file to calculate BMI. Physical Exam  Constitutional:       Appearance: She is well-developed. HENT:      Right Ear: External ear normal.      Left Ear: External ear normal.      Mouth/Throat:      Pharynx: No oropharyngeal exudate.    Eyes: medication is not currently listed on her medication list since patient did not inform us of starting this medication at last week appt      Recommendations today  1. Patient has checked with her [address] [place] [address] endocrinologist regarding possibility if this rash could be caused by methimazole  This medication was started over a month ago and per endocrinologist likely not reason for her current rash   2. Depo-Medrol 80 mg IM today  3. Patient will call us to update us regarding her rash on 4/16/2020 a.m. I have discussed with the patient but even though it is not typical rash experienced with the methimazole it is not impossible that methimazole is causative agent  If the rash persists or recurs she may need to place methimazole on hold for 1 to 2 weeks  Further plans when she calls me back on 4/16/2020    If any increased symptoms prior to that date she would need to notify us  No orders of the defined types were placed in this encounter. New Prescriptions    No medications on file         No follow-ups on file. There are no Patient Instructions on file for this visit. EMR Dragon/transcription disclaimer:Significant part of this  encounter note is electronic transcription/translationof spoken language to printed text. The electronic translation of spoken language may be erroneous, or at times, nonsensical words or phrases may be inadvertently transcribed.  Although I have reviewed the note for sucherrors, some may still exist.

## 2020-04-14 ENCOUNTER — TELEPHONE (OUTPATIENT)
Dept: INTERNAL MEDICINE | Age: 50
End: 2020-04-14

## 2020-04-27 ENCOUNTER — TELEPHONE (OUTPATIENT)
Dept: FAMILY MEDICINE CLINIC | Facility: CLINIC | Age: 50
End: 2020-04-27

## 2020-04-27 NOTE — TELEPHONE ENCOUNTER
But I don't know what it is for - many uses/indications, but [facility] is known to just send a Rx to have filled without all the information for us. Can you please call them to see what it was indicated for? Thanks.

## 2020-04-27 NOTE — TELEPHONE ENCOUNTER
A rep from the [facility] pharmacy called over requesting a refill on the patients NALTREXONE HCL PO medication (would not allow me to select in chart). Please advise.

## 2020-04-28 NOTE — TELEPHONE ENCOUNTER
I've called [facility] and they related we've filled this (via Fax I presume) since 2018 for auto-immune issues for joint pain - I'm not aware of this indication, even for off-label. They will send the original documentation that the pharmacy has and we can review that when they send it - please let me know when scanned in.     Will have to reference this use - apparently per [place] Pharmacist, it can be used in low dose for auto-immune arthritic issues, like rheumatoid arthritis.

## 2020-04-28 NOTE — TELEPHONE ENCOUNTER
Spoke to [facility] - they state no indication was listed. Spoke to pt & she states that it is used at bedtime for joint pain.

## 2020-04-30 NOTE — TELEPHONE ENCOUNTER
Refill request was rec'd from [facility] for this medication. Form was signed 4/29/20 by Dr. Ramires and faxed back to [facility] today.

## 2020-06-02 ENCOUNTER — PATIENT MESSAGE (OUTPATIENT)
Dept: INTERNAL MEDICINE | Age: 50
End: 2020-06-02

## 2020-06-03 ENCOUNTER — TRANSCRIBE ORDERS (OUTPATIENT)
Dept: ADMINISTRATIVE | Facility: HOSPITAL | Age: 50
End: 2020-06-03

## 2020-06-03 DIAGNOSIS — Z12.39 ENCOUNTER FOR SCREENING FOR MALIGNANT NEOPLASM OF BREAST: Primary | ICD-10-CM

## 2020-06-12 ENCOUNTER — HOSPITAL ENCOUNTER (OUTPATIENT)
Dept: MAMMOGRAPHY | Facility: HOSPITAL | Age: 50
Discharge: HOME | End: 2020-06-12
Admitting: INTERNAL MEDICINE

## 2020-06-12 DIAGNOSIS — Z12.39 ENCOUNTER FOR SCREENING FOR MALIGNANT NEOPLASM OF BREAST: ICD-10-CM

## 2020-06-12 PROCEDURE — 77063 BREAST TOMOSYNTHESIS BI: CPT

## 2020-06-12 PROCEDURE — 77067 SCR MAMMO BI INCL CAD: CPT

## 2020-06-25 RX ORDER — TRAZODONE HYDROCHLORIDE 50 MG/1
TABLET ORAL
Qty: 90 TABLET | Refills: 1 | Status: SHIPPED | OUTPATIENT
Start: 2020-06-25 | End: 2020-08-24 | Stop reason: SDUPTHER

## 2020-08-19 RX ORDER — MONTELUKAST SODIUM 10 MG/1
TABLET ORAL
Qty: 90 TABLET | Refills: 3 | OUTPATIENT
Start: 2020-08-19

## 2020-08-23 ENCOUNTER — PATIENT MESSAGE (OUTPATIENT)
Dept: INTERNAL MEDICINE | Age: 50
End: 2020-08-23

## 2020-08-24 RX ORDER — TRAZODONE HYDROCHLORIDE 50 MG/1
TABLET ORAL
Qty: 90 TABLET | Refills: 1 | Status: SHIPPED | OUTPATIENT
Start: 2020-08-24 | End: 2020-12-21

## 2020-08-24 RX ORDER — MONTELUKAST SODIUM 10 MG/1
10 TABLET ORAL DAILY
Qty: 90 TABLET | Refills: 3 | Status: SHIPPED | OUTPATIENT
Start: 2020-08-24 | End: 2021-03-02 | Stop reason: SDUPTHER

## 2020-08-24 NOTE — TELEPHONE ENCOUNTER
From: Benita Gutiérrez  To: Sophie Morris MD  Sent: 8/23/2020 1:25 PM CDT  Subject: Prescription Question    I am needing you to call in or E Fax prescriptions for Trazadone 50 mg one tablet nightly; Montelukast 10 mg one tablet nightly to [facility]     Naltrexone joint pain 4.5 mg one tablet at night to [facility]. I have told both places numerous times to call you but they keep calling old doctors. Thanks.

## 2020-09-01 ENCOUNTER — OFFICE VISIT (OUTPATIENT)
Dept: INTERNAL MEDICINE | Age: 50
End: 2020-09-01
Payer: COMMERCIAL

## 2020-09-01 VITALS
HEIGHT: 66 IN | SYSTOLIC BLOOD PRESSURE: 110 MMHG | WEIGHT: 156 LBS | DIASTOLIC BLOOD PRESSURE: 78 MMHG | HEART RATE: 85 BPM | RESPIRATION RATE: 18 BRPM | OXYGEN SATURATION: 99 % | BODY MASS INDEX: 25.07 KG/M2

## 2020-09-01 DIAGNOSIS — E03.9 HYPOTHYROIDISM (ACQUIRED): ICD-10-CM

## 2020-09-01 DIAGNOSIS — R73.01 IFG (IMPAIRED FASTING GLUCOSE): ICD-10-CM

## 2020-09-01 PROBLEM — E05.90 HYPERTHYROIDISM: Status: ACTIVE | Noted: 2020-09-01

## 2020-09-01 PROBLEM — E55.9 VITAMIN D DEFICIENCY: Status: ACTIVE | Noted: 2020-09-01

## 2020-09-01 PROBLEM — E06.3 AUTOIMMUNE THYROIDITIS: Status: ACTIVE | Noted: 2020-09-01

## 2020-09-01 LAB
BASOPHILS ABSOLUTE: 0 K/UL (ref 0–0.2)
BASOPHILS RELATIVE PERCENT: 0 % (ref 0–1)
EOSINOPHILS ABSOLUTE: 0 K/UL (ref 0–0.6)
EOSINOPHILS RELATIVE PERCENT: 0.2 % (ref 0–5)
HBA1C MFR BLD: 4.9 % (ref 4–6)
HCT VFR BLD CALC: 41.5 % (ref 37–47)
HEMOGLOBIN: 13.8 G/DL (ref 12–16)
IMMATURE GRANULOCYTES #: 0 K/UL
LYMPHOCYTES ABSOLUTE: 1.4 K/UL (ref 1.1–4.5)
LYMPHOCYTES RELATIVE PERCENT: 34.7 % (ref 20–40)
MCH RBC QN AUTO: 30.7 PG (ref 27–31)
MCHC RBC AUTO-ENTMCNC: 33.3 G/DL (ref 33–37)
MCV RBC AUTO: 92.2 FL (ref 81–99)
MONOCYTES ABSOLUTE: 0.4 K/UL (ref 0–0.9)
MONOCYTES RELATIVE PERCENT: 8.9 % (ref 0–10)
NEUTROPHILS ABSOLUTE: 2.3 K/UL (ref 1.5–7.5)
NEUTROPHILS RELATIVE PERCENT: 56 % (ref 50–65)
PDW BLD-RTO: 14 % (ref 11.5–14.5)
PLATELET # BLD: 279 K/UL (ref 130–400)
PMV BLD AUTO: 9.9 FL (ref 9.4–12.3)
RBC # BLD: 4.5 M/UL (ref 4.2–5.4)
T3 FREE: 2.6 PG/ML (ref 2–4.4)
T4 FREE: 0.69 NG/DL (ref 0.93–1.7)
TSH SERPL DL<=0.05 MIU/L-ACNC: 10.18 UIU/ML (ref 0.27–4.2)
WBC # BLD: 4.2 K/UL (ref 4.8–10.8)

## 2020-09-01 PROCEDURE — 99214 OFFICE O/P EST MOD 30 MIN: CPT | Performed by: INTERNAL MEDICINE

## 2020-09-01 RX ORDER — CETIRIZINE HYDROCHLORIDE 10 MG/1
20 TABLET ORAL 2 TIMES DAILY
COMMUNITY

## 2020-09-01 RX ORDER — PSEUDOEPHEDRINE HCL 120 MG/1
120 TABLET, FILM COATED, EXTENDED RELEASE ORAL EVERY 12 HOURS
Qty: 60 TABLET | Refills: 2 | Status: SHIPPED | OUTPATIENT
Start: 2020-09-01 | End: 2020-10-01

## 2020-09-01 ASSESSMENT — ENCOUNTER SYMPTOMS
COUGH: 0
WHEEZING: 0
CHEST TIGHTNESS: 0
ABDOMINAL PAIN: 0
SORE THROAT: 0
CONSTIPATION: 0

## 2020-09-01 ASSESSMENT — PATIENT HEALTH QUESTIONNAIRE - PHQ9
SUM OF ALL RESPONSES TO PHQ QUESTIONS 1-9: 0
SUM OF ALL RESPONSES TO PHQ9 QUESTIONS 1 & 2: 0
2. FEELING DOWN, DEPRESSED OR HOPELESS: 0
SUM OF ALL RESPONSES TO PHQ QUESTIONS 1-9: 0
1. LITTLE INTEREST OR PLEASURE IN DOING THINGS: 0

## 2020-09-01 NOTE — PROGRESS NOTES
Chief Complaint   Patient presents with    6 Month Follow-Up     History of presenting illness:  Zaria Fu is a54 y.o. female who presents today for follow up on her chronic medical conditions as noted below. AUtoimmune thryoiditis  Patient has been following with [facility] endocrinology  Patient has been seeing [facility] endocrinology for her autoimmune thyroiditis issues  Methimazole decreased end of July       Sleeping issues on and off  She takes trazodone at nighttime to help her sleep     Issues with allergies and pruritus  Now on Zyrtec 20 bid and she stays on Flonase no spray  and Singulair.   Currently her symptoms mostly have been controlled     Has issues with knee pains  Has seen dr Majel Halsted in past  No x-rays available  Patient states that received injection     Vitamin D deficiency  Was on supplement  Stopped  Level 53 3/2020       Patient Active Problem List    Diagnosis Date Noted    Hyperthyroidism 09/01/2020    Autoimmune thyroiditis 09/01/2020    Vitamin D deficiency 09/01/2020    IFG (impaired fasting glucose) 09/01/2020     Past Medical History:   Diagnosis Date    Hashimoto's thyroiditis       Past Surgical History:   Procedure Laterality Date    COLONOSCOPY  2012    dr Urszula Jimenez, TOTAL ABDOMINAL       Current Outpatient Medications   Medication Sig Dispense Refill    cetirizine (ZYRTEC) 10 MG tablet Take 20 mg by mouth 2 times daily      pseudoephedrine (SUDAFED 12 HR) 120 MG extended release tablet Take 1 tablet by mouth every 12 hours 60 tablet 2    traZODone (DESYREL) 50 MG tablet TAKE 1 TABLET BY MOUTH EVERYDAY AT BEDTIME 90 tablet 1    montelukast (SINGULAIR) 10 MG tablet Take 1 tablet by mouth daily 90 tablet 3    methIMAzole (TAPAZOLE) 10 MG tablet Take 15 mg by mouth daily       fluticasone (VERAMYST) 27.5 MCG/SPRAY nasal spray 2 sprays by Each Nostril route daily      Naltrexone-Triamcinolone Heart sounds: Normal heart sounds. No murmur. Pulmonary:      Effort: No respiratory distress. Breath sounds: Normal breath sounds. No wheezing or rales. Chest:      Chest wall: No tenderness. Abdominal:      General: Bowel sounds are normal.      Palpations: Abdomen is soft. Musculoskeletal: Normal range of motion. Lymphadenopathy:      Cervical: No cervical adenopathy. Skin:     General: Skin is warm. Findings: No rash. Neurological:      Mental Status: She is oriented to person, place, and time. Lab Review   No visits with results within 2 Month(s) from this visit. Latest known visit with results is:   Orders Only on 04/13/2020   Component Date Value    WBC 04/13/2020 5.6     RBC 04/13/2020 4.05*    Hemoglobin 04/13/2020 12.4     Hematocrit 04/13/2020 36.4*    MCV 04/13/2020 89.9     MCH 04/13/2020 30.6     MCHC 04/13/2020 34.1     RDW 04/13/2020 14.6*    Platelets 84/94/6024 355     MPV 04/13/2020 9.6     Neutrophils % 04/13/2020 68.4*    Lymphocytes % 04/13/2020 22.0     Monocytes % 04/13/2020 9.2     Eosinophils % 04/13/2020 0.0     Basophils % 04/13/2020 0.0     Neutrophils Absolute 04/13/2020 3.8     Immature Granulocytes # 04/13/2020 0.0     Lymphocytes Absolute 04/13/2020 1.2     Monocytes Absolute 04/13/2020 0.50     Eosinophils Absolute 04/13/2020 0.00     Basophils Absolute 04/13/2020 0.00            ASSESSMENT/PLAN:    AUtoimmune thryoiditis  Patient has been following with [facility] endocrinology  Patient has been seeing [facility] endocrinology for her autoimmune thyroiditis issues  Methimazole decreased end of July  Obtain lab today and send copy to [facility]  Patient will continue to follow at [facility] [employer] issues on and off  She takes trazodone at nighttime to help her sleep     Issues with allergies she stays on Flonase no spray, Allegra and Singulair.   Currently her symptoms mostly have been controlled     Has issues with knee dioni  Has seen dr Moseley in past  No x-rays available  Patient states that received injection  Offered her to have repeat x-rays done today, she refused at this time it does not want to see any orthopedic specialist at this time        Vitamin D deficiency  Level 56 in March 2020  Restart vitamin D supplement for wintertime    Low WBC 3/2020 appt  Repeat CBC 4/2020 white cell count 5.1  Repeat today since patient remains on methimazole    Very mild BS elevation 3/2020 107  Obtain A1c today        Orders Placed This Encounter   Procedures    CBC Auto Differential    TSH without Reflex    T4, Free    T3, Free    Hemoglobin A1C    CBC Auto Differential    Comprehensive Metabolic Panel    Lipid Panel    Urinalysis    Vitamin D 25 Hydroxy     New Prescriptions    PSEUDOEPHEDRINE (SUDAFED 12 HR) 120 MG EXTENDED RELEASE TABLET    Take 1 tablet by mouth every 12 hours         Return in about 6 months (around 3/1/2021) for Annual Physical.   There are no Patient Instructions on file for this visit. EMR Dragon/transcription disclaimer:Significant part of this  encounter note is electronic transcription/translationof spoken language to printed text. The electronic translation of spoken language may be erroneous, or at times, nonsensical words or phrases may be inadvertently transcribed.  Although I have reviewed the note for sucherrors, some may still exist.

## 2020-11-03 RX ORDER — CHOLECALCIFEROL (VITAMIN D3) 100000/G
POWDER (GRAM) MISCELLANEOUS
Refills: 0 | OUTPATIENT
Start: 2020-11-03

## 2020-11-13 ENCOUNTER — VIRTUAL VISIT (OUTPATIENT)
Dept: INTERNAL MEDICINE | Age: 50
End: 2020-11-13
Payer: COMMERCIAL

## 2020-11-13 PROCEDURE — 99213 OFFICE O/P EST LOW 20 MIN: CPT | Performed by: INTERNAL MEDICINE

## 2020-11-13 RX ORDER — AZITHROMYCIN 250 MG/1
250 TABLET, FILM COATED ORAL SEE ADMIN INSTRUCTIONS
Qty: 6 TABLET | Refills: 0 | Status: SHIPPED | OUTPATIENT
Start: 2020-11-13 | End: 2020-11-18

## 2020-11-13 RX ORDER — PROMETHAZINE HYDROCHLORIDE AND CODEINE PHOSPHATE 6.25; 1 MG/5ML; MG/5ML
5 SYRUP ORAL 2 TIMES DAILY PRN
Qty: 100 ML | Refills: 0 | Status: SHIPPED | OUTPATIENT
Start: 2020-11-13 | End: 2020-11-27

## 2020-11-13 RX ORDER — PREDNISONE 10 MG/1
10 TABLET ORAL 2 TIMES DAILY
Qty: 6 TABLET | Refills: 0 | Status: SHIPPED | OUTPATIENT
Start: 2020-11-13 | End: 2020-11-16

## 2020-11-13 ASSESSMENT — ENCOUNTER SYMPTOMS
ABDOMINAL PAIN: 0
CHEST TIGHTNESS: 0
COUGH: 1
SORE THROAT: 1
WHEEZING: 1
CONSTIPATION: 0
SINUS PRESSURE: 1

## 2020-12-03 NOTE — TELEPHONE ENCOUNTER
Annia Welch called requesting a refill of the below medication which has been pended for you:     Requested Prescriptions     Pending Prescriptions Disp Refills    Naltrexone HCl POWD [Pharmacy Med Name: NALTREXONE 4.5 MG CAPS] 30 g 2     Sig: TAKE 1 CAPSULE AT BEDTIME       Last Appointment Date: 11/13/2020  Next Appointment Date: 3/2/2021    No Known Allergies

## 2020-12-21 RX ORDER — TRAZODONE HYDROCHLORIDE 50 MG/1
TABLET ORAL
Qty: 90 TABLET | Refills: 0 | Status: SHIPPED | OUTPATIENT
Start: 2020-12-21 | End: 2021-03-02 | Stop reason: SDUPTHER

## 2021-02-26 DIAGNOSIS — R73.01 IFG (IMPAIRED FASTING GLUCOSE): ICD-10-CM

## 2021-02-26 DIAGNOSIS — E05.90 HYPERTHYROIDISM: ICD-10-CM

## 2021-02-26 DIAGNOSIS — R79.89 ABNORMAL CBC: ICD-10-CM

## 2021-02-26 DIAGNOSIS — E06.3 AUTOIMMUNE THYROIDITIS: ICD-10-CM

## 2021-02-26 DIAGNOSIS — E55.9 VITAMIN D DEFICIENCY: ICD-10-CM

## 2021-02-26 LAB
ALBUMIN SERPL-MCNC: 4.3 G/DL (ref 3.5–5.2)
ALP BLD-CCNC: 121 U/L (ref 35–104)
ALT SERPL-CCNC: 23 U/L (ref 5–33)
ANION GAP SERPL CALCULATED.3IONS-SCNC: 8 MMOL/L (ref 7–19)
AST SERPL-CCNC: 20 U/L (ref 5–32)
BACTERIA: ABNORMAL /HPF
BASOPHILS ABSOLUTE: 0 K/UL (ref 0–0.2)
BASOPHILS RELATIVE PERCENT: 0.8 % (ref 0–1)
BILIRUB SERPL-MCNC: 0.5 MG/DL (ref 0.2–1.2)
BILIRUBIN URINE: NEGATIVE
BLOOD, URINE: NEGATIVE
BUN BLDV-MCNC: 23 MG/DL (ref 6–20)
CALCIUM SERPL-MCNC: 9.1 MG/DL (ref 8.6–10)
CHLORIDE BLD-SCNC: 108 MMOL/L (ref 98–111)
CHOLESTEROL, TOTAL: 183 MG/DL (ref 160–199)
CLARITY: CLEAR
CO2: 28 MMOL/L (ref 22–29)
COLOR: YELLOW
CREAT SERPL-MCNC: 0.8 MG/DL (ref 0.5–0.9)
EOSINOPHILS ABSOLUTE: 0.2 K/UL (ref 0–0.6)
EOSINOPHILS RELATIVE PERCENT: 4.3 % (ref 0–5)
EPITHELIAL CELLS, UA: ABNORMAL /HPF
GFR AFRICAN AMERICAN: >59
GFR NON-AFRICAN AMERICAN: >60
GLUCOSE BLD-MCNC: 90 MG/DL (ref 74–109)
GLUCOSE URINE: NEGATIVE MG/DL
HCT VFR BLD CALC: 38.7 % (ref 37–47)
HDLC SERPL-MCNC: 57 MG/DL (ref 65–121)
HEMOGLOBIN: 13 G/DL (ref 12–16)
IMMATURE GRANULOCYTES #: 0 K/UL
KETONES, URINE: NEGATIVE MG/DL
LDL CHOLESTEROL CALCULATED: 108 MG/DL
LEUKOCYTE ESTERASE, URINE: ABNORMAL
LYMPHOCYTES ABSOLUTE: 1.5 K/UL (ref 1.1–4.5)
LYMPHOCYTES RELATIVE PERCENT: 36.9 % (ref 20–40)
MCH RBC QN AUTO: 32.3 PG (ref 27–31)
MCHC RBC AUTO-ENTMCNC: 33.6 G/DL (ref 33–37)
MCV RBC AUTO: 96.3 FL (ref 81–99)
MONOCYTES ABSOLUTE: 0.4 K/UL (ref 0–0.9)
MONOCYTES RELATIVE PERCENT: 9.4 % (ref 0–10)
NEUTROPHILS ABSOLUTE: 1.9 K/UL (ref 1.5–7.5)
NEUTROPHILS RELATIVE PERCENT: 48.6 % (ref 50–65)
NITRITE, URINE: NEGATIVE
PDW BLD-RTO: 12.9 % (ref 11.5–14.5)
PH UA: 5 (ref 5–8)
PLATELET # BLD: 245 K/UL (ref 130–400)
PMV BLD AUTO: 10.3 FL (ref 9.4–12.3)
POTASSIUM SERPL-SCNC: 4.2 MMOL/L (ref 3.5–5)
PROTEIN UA: NEGATIVE MG/DL
RBC # BLD: 4.02 M/UL (ref 4.2–5.4)
RBC UA: ABNORMAL /HPF (ref 0–2)
SODIUM BLD-SCNC: 144 MMOL/L (ref 136–145)
SPECIFIC GRAVITY UA: 1.02 (ref 1–1.03)
TOTAL PROTEIN: 6.4 G/DL (ref 6.6–8.7)
TRIGL SERPL-MCNC: 90 MG/DL (ref 0–149)
UROBILINOGEN, URINE: 0.2 E.U./DL
VITAMIN D 25-HYDROXY: 44.1 NG/ML
WBC # BLD: 3.9 K/UL (ref 4.8–10.8)
WBC UA: ABNORMAL /HPF (ref 0–5)

## 2021-03-02 ENCOUNTER — OFFICE VISIT (OUTPATIENT)
Dept: INTERNAL MEDICINE | Age: 51
End: 2021-03-02
Payer: COMMERCIAL

## 2021-03-02 VITALS
OXYGEN SATURATION: 98 % | BODY MASS INDEX: 27.48 KG/M2 | DIASTOLIC BLOOD PRESSURE: 80 MMHG | HEIGHT: 66 IN | HEART RATE: 74 BPM | SYSTOLIC BLOOD PRESSURE: 122 MMHG | WEIGHT: 171 LBS | RESPIRATION RATE: 18 BRPM

## 2021-03-02 DIAGNOSIS — J20.9 ACUTE BRONCHITIS AND BRONCHIOLITIS: ICD-10-CM

## 2021-03-02 DIAGNOSIS — Z12.31 ENCOUNTER FOR SCREENING MAMMOGRAM FOR BREAST CANCER: ICD-10-CM

## 2021-03-02 DIAGNOSIS — E03.9 HYPOTHYROIDISM (ACQUIRED): ICD-10-CM

## 2021-03-02 DIAGNOSIS — E55.9 VITAMIN D DEFICIENCY: ICD-10-CM

## 2021-03-02 DIAGNOSIS — R05.9 COUGH: ICD-10-CM

## 2021-03-02 DIAGNOSIS — J21.9 ACUTE BRONCHITIS AND BRONCHIOLITIS: ICD-10-CM

## 2021-03-02 DIAGNOSIS — Z00.00 ANNUAL PHYSICAL EXAM: Primary | ICD-10-CM

## 2021-03-02 DIAGNOSIS — Z11.59 NEED FOR HEPATITIS C SCREENING TEST: ICD-10-CM

## 2021-03-02 DIAGNOSIS — E06.3 AUTOIMMUNE THYROIDITIS: ICD-10-CM

## 2021-03-02 DIAGNOSIS — F51.01 PRIMARY INSOMNIA: ICD-10-CM

## 2021-03-02 DIAGNOSIS — Z11.4 SCREENING FOR HIV (HUMAN IMMUNODEFICIENCY VIRUS): ICD-10-CM

## 2021-03-02 DIAGNOSIS — R73.01 IFG (IMPAIRED FASTING GLUCOSE): ICD-10-CM

## 2021-03-02 PROCEDURE — 99396 PREV VISIT EST AGE 40-64: CPT | Performed by: INTERNAL MEDICINE

## 2021-03-02 RX ORDER — MONTELUKAST SODIUM 10 MG/1
10 TABLET ORAL DAILY
Qty: 90 TABLET | Refills: 3 | Status: SHIPPED | OUTPATIENT
Start: 2021-03-02 | End: 2022-05-24

## 2021-03-02 RX ORDER — ALBUTEROL SULFATE 90 UG/1
2 AEROSOL, METERED RESPIRATORY (INHALATION) 4 TIMES DAILY PRN
Qty: 1 INHALER | Refills: 0 | Status: SHIPPED | OUTPATIENT
Start: 2021-03-02 | End: 2021-03-25

## 2021-03-02 RX ORDER — TRAZODONE HYDROCHLORIDE 50 MG/1
TABLET ORAL
Qty: 90 TABLET | Refills: 3 | Status: SHIPPED | OUTPATIENT
Start: 2021-03-02 | End: 2022-05-26

## 2021-03-02 RX ORDER — PROMETHAZINE HYDROCHLORIDE AND CODEINE PHOSPHATE 6.25; 1 MG/5ML; MG/5ML
5 SYRUP ORAL 2 TIMES DAILY PRN
Qty: 100 ML | Refills: 0 | Status: SHIPPED | OUTPATIENT
Start: 2021-03-02 | End: 2021-03-05

## 2021-03-02 RX ORDER — AZITHROMYCIN 250 MG/1
250 TABLET, FILM COATED ORAL SEE ADMIN INSTRUCTIONS
Qty: 6 TABLET | Refills: 0 | Status: SHIPPED | OUTPATIENT
Start: 2021-03-02 | End: 2021-03-07

## 2021-03-02 SDOH — ECONOMIC STABILITY: INCOME INSECURITY: HOW HARD IS IT FOR YOU TO PAY FOR THE VERY BASICS LIKE FOOD, HOUSING, MEDICAL CARE, AND HEATING?: NOT HARD AT ALL

## 2021-03-02 SDOH — ECONOMIC STABILITY: FOOD INSECURITY: WITHIN THE PAST 12 MONTHS, YOU WORRIED THAT YOUR FOOD WOULD RUN OUT BEFORE YOU GOT MONEY TO BUY MORE.: NEVER TRUE

## 2021-03-02 ASSESSMENT — ENCOUNTER SYMPTOMS
BLOOD IN STOOL: 0
EYE PAIN: 0
VOICE CHANGE: 0
DIARRHEA: 0
SORE THROAT: 0
SINUS PRESSURE: 0
NAUSEA: 0
EYE REDNESS: 0
VOMITING: 0
COUGH: 1
ABDOMINAL PAIN: 0
COLOR CHANGE: 0
CONSTIPATION: 0
TROUBLE SWALLOWING: 0
CHEST TIGHTNESS: 0
WHEEZING: 0

## 2021-03-02 ASSESSMENT — PATIENT HEALTH QUESTIONNAIRE - PHQ9
SUM OF ALL RESPONSES TO PHQ QUESTIONS 1-9: 0
SUM OF ALL RESPONSES TO PHQ9 QUESTIONS 1 & 2: 0
SUM OF ALL RESPONSES TO PHQ QUESTIONS 1-9: 0
2. FEELING DOWN, DEPRESSED OR HOPELESS: 0
SUM OF ALL RESPONSES TO PHQ QUESTIONS 1-9: 0

## 2021-03-02 NOTE — PROGRESS NOTES
Chief Complaint:   Silver Hastings is a 52 y.o. female who presents forcomplete physical exam.    History of Present Illness:      Silver Hastings is a 52 y.o. female who presents todayfor wellness visit AND follow up on her chronic medical conditions as noted below. New complaint   congestion/ cough for last 2 weeks  Wheezy at times  Takes singulair + zyrtec + mucinex    Here for follow-up regarding wellness visit, insomnia. Etc. Patient Active Problem List    Diagnosis Date Noted    Hyperthyroidism 09/01/2020    Autoimmune thyroiditis 09/01/2020    Vitamin D deficiency 09/01/2020    IFG (impaired fasting glucose) 09/01/2020       Past Medical History:   Diagnosis Date    Hashimoto's thyroiditis        Past Surgical History:   Procedure Laterality Date    COLONOSCOPY  2012    dr Regis Pate, TOTAL ABDOMINAL         Current Outpatient Medications   Medication Sig Dispense Refill    traZODone (DESYREL) 50 MG tablet Take 1 tablet every evening 90 tablet 3    Naltrexone HCl POWD TAKE 1 CAPSULE AT BEDTIME 90 g 3    montelukast (SINGULAIR) 10 MG tablet Take 1 tablet by mouth daily 90 tablet 3    azithromycin (ZITHROMAX) 250 MG tablet Take 1 tablet by mouth See Admin Instructions for 5 days 500mg on day 1 followed by 250mg on days 2 - 5 6 tablet 0    albuterol sulfate HFA (VENTOLIN HFA) 108 (90 Base) MCG/ACT inhaler Inhale 2 puffs into the lungs 4 times daily as needed for Wheezing 1 Inhaler 0    promethazine-codeine (PHENERGAN WITH CODEINE) 6.25-10 MG/5ML syrup Take 5 mLs by mouth 2 times daily as needed for Cough for up to 3 days.  100 mL 0    cetirizine (ZYRTEC) 10 MG tablet Take 20 mg by mouth 2 times daily      methIMAzole (TAPAZOLE) 10 MG tablet Take 15 mg by mouth daily       Naltrexone-Triamcinolone (NALTREXONE SC) Inject 4.5 mg into the skin Indications: for joint pain from [facility]  fluticasone (VERAMYST) 27.5 MCG/SPRAY nasal spray 2 sprays by Each Nostril route daily       No current facility-administered medications for this visit.       No Known Allergies    Social History     Socioeconomic History    Marital status:      Spouse name: Lesia Tucker Number of children: 0    Years of education: None    Highest education level: None   Occupational History    Occupation: [address] [place] [address] Street: [address]     Social Needs    Financial resource strain: Not hard at all   [employer] insecurity     Worry: Never true     Inability: Never true   [employer] needs     Medical: No     Non-medical: No   Tobacco Use    Smoking status: Never Smoker    Smokeless tobacco: Never Used   Substance and Sexual Activity    Alcohol use: Never     Frequency: Never    Drug use: Never    Sexual activity: Yes   Lifestyle    Physical activity     Days per week: None     Minutes per session: None    Stress: None   Relationships    Social connections     Talks on phone: None     Gets together: None     Attends Buddhism service: None     Active member of club or organization: None     Attends meetings of clubs or organizations: None     Relationship status: None    Intimate partner violence     Fear of current or ex partner: None     Emotionally abused: None     Physically abused: None     Forced sexual activity: None   Other Topics Concern    None   Social History Narrative    None     Family History   Problem Relation Age of Onset    Thyroid Disease Mother     High Blood Pressure Father     Anxiety Disorder Father     Arthritis Father     Cancer Maternal Grandmother     Diabetes Paternal Grandmother     Cancer Paternal Grandmother           Past Surgical History:   Procedure Laterality Date    COLONOSCOPY  2012    dr Satya Jenkins, TOTAL ABDOMINAL           Lab Review   Orders Only on 02/26/2021 Component Date Value    Vit D, 25-Hydroxy 02/26/2021 44.1     Color, UA 02/26/2021 YELLOW     Clarity, UA 02/26/2021 Clear     Glucose, Ur 02/26/2021 Negative     Bilirubin Urine 02/26/2021 Negative     Ketones, Urine 02/26/2021 Negative     Specific Gravity, UA 02/26/2021 1.017     Blood, Urine 02/26/2021 Negative     pH, UA 02/26/2021 5.0     Protein, UA 02/26/2021 Negative     Urobilinogen, Urine 02/26/2021 0.2     Nitrite, Urine 02/26/2021 Negative     Leukocyte Esterase, Urine 02/26/2021 TRACE*    Cholesterol, Total 02/26/2021 183     Triglycerides 02/26/2021 90     HDL 02/26/2021 57*    LDL Calculated 02/26/2021 108     Sodium 02/26/2021 144     Potassium 02/26/2021 4.2     Chloride 02/26/2021 108     CO2 02/26/2021 28     Anion Gap 02/26/2021 8     Glucose 02/26/2021 90     BUN 02/26/2021 23*    CREATININE 02/26/2021 0.8     GFR Non- 02/26/2021 >60     GFR  02/26/2021 >59     Calcium 02/26/2021 9.1     Total Protein 02/26/2021 6.4*    Albumin 02/26/2021 4.3     Total Bilirubin 02/26/2021 0.5     Alkaline Phosphatase 02/26/2021 121*    ALT 02/26/2021 23     AST 02/26/2021 20     WBC 02/26/2021 3.9*    RBC 02/26/2021 4.02*    Hemoglobin 02/26/2021 13.0     Hematocrit 02/26/2021 38.7     MCV 02/26/2021 96.3     MCH 02/26/2021 32.3*    MCHC 02/26/2021 33.6     RDW 02/26/2021 12.9     Platelets 04/83/8185 245     MPV 02/26/2021 10.3     Neutrophils % 02/26/2021 48.6*    Lymphocytes % 02/26/2021 36.9     Monocytes % 02/26/2021 9.4     Eosinophils % 02/26/2021 4.3     Basophils % 02/26/2021 0.8     Neutrophils Absolute 02/26/2021 1.9     Immature Granulocytes # 02/26/2021 0.0     Lymphocytes Absolute 02/26/2021 1.5     Monocytes Absolute 02/26/2021 0.40     Eosinophils Absolute 02/26/2021 0.20     Basophils Absolute 02/26/2021 0.00     WBC, UA 02/26/2021 2-4     RBC, UA 02/26/2021 0-1     Epithelial Cells, UA 02/26/2021 3-5  Bacteria, UA 02/26/2021 Rare*         Review of Systems   Constitutional: Negative for chills, fatigue and fever. HENT: Positive for congestion. Negative for ear pain, postnasal drip, sinus pressure, sore throat, trouble swallowing and voice change. Eyes: Negative for pain, redness and visual disturbance. Respiratory: Positive for cough. Negative for chest tightness and wheezing. Cardiovascular: Negative for chest pain, palpitations and leg swelling. Gastrointestinal: Negative for abdominal pain, blood in stool, constipation, diarrhea, nausea and vomiting. Endocrine: Negative for polydipsia and polyuria. Genitourinary: Negative for dysuria, enuresis, flank pain, frequency and urgency. Musculoskeletal: Negative for arthralgias, gait problem and joint swelling. Skin: Negative for color change and rash. Neurological: Negative for dizziness, tremors, syncope, facial asymmetry, speech difficulty, weakness, numbness and headaches. Psychiatric/Behavioral: Negative for agitation, behavioral problems, confusion, sleep disturbance and suicidal ideas. The patient is not nervous/anxious. Vitals:    03/02/21 0914   BP: 122/80   Site: Left Upper Arm   Position: Sitting   Cuff Size: Large Adult   Pulse: 74   Resp: 18   SpO2: 98%   Weight: 171 lb (77.6 kg)   Height: 5' 6\" (1.676 m)      Wt Readings from Last 3 Encounters:   03/02/21 171 lb (77.6 kg)   09/01/20 156 lb (70.8 kg)   03/04/20 141 lb (64 kg)   Body mass index is 27.6 kg/m². BP Readings from Last 3 Encounters:   03/02/21 122/80   09/01/20 110/78   03/04/20 110/70       Physical Exam  Constitutional:       General: She is not in acute distress. Appearance: She is well-developed. She is not diaphoretic. HENT:      Head: Normocephalic and atraumatic. Nose: Nose normal.   Eyes:      General: No scleral icterus. Right eye: No discharge. Left eye: No discharge.       Conjunctiva/sclera: Conjunctivae normal. Pupils: Pupils are equal, round, and reactive to light. Neck:      Musculoskeletal: Normal range of motion. Thyroid: No thyromegaly. Vascular: No JVD. Cardiovascular:      Rate and Rhythm: Normal rate and regular rhythm. Heart sounds: No murmur. Pulmonary:      Breath sounds: Rhonchi and rales present. Chest:      Chest wall: No tenderness. Abdominal:      General: Bowel sounds are normal. There is no distension. Tenderness: There is no guarding. Lymphadenopathy:      Cervical: No cervical adenopathy. Skin:     General: Skin is dry. Findings: No erythema or rash. Neurological:      Mental Status: She is oriented to person, place, and time. Cranial Nerves: No cranial nerve deficit. Coordination: Coordination normal.      Deep Tendon Reflexes: Reflexes are normal and symmetric. Psychiatric:         Behavior: Behavior normal.         Thought Content: Thought content normal.         Judgment: Judgment normal.           ASSESSMENT/PLAN    Annual physical  * LDL is 108/HDL 57- diet lower in saturated fats  * mammo order placed  * pap NA    (HO endometriosis  Post total hysterectomy  Developed bleeding post hysterctomy- per gyn had endometriosis implant left- since then no issues  Does not need further pap's)     Sleeping issues on and off  She takes trazodone at nighttime to help her sleep     Issues with allergies she stays on Flonase no spray, Allegra and Singulair.   Currently her symptoms mostly have been controlled     Has issues with knee pains  Has seen dr Carmen Gardiner in past  No x-rays available  Patient states that received injection  Offered her to have repeat x-rays done today, she refused at this time it does not want to see any orthopedic specialist at this time        Vitamin D deficiency  Level 44  Take vit d every other week summers/ every week winter    Coughing with wheezing   coughing/ congestion last 2 weeks  No f/c  Wheezing  Rx  Zithromax Z-JUAN ANTONIO Albuterol inhaler  Cough syrup  Continue using Singulair and Zyrtec and Mucinex twice daily  If sx not improving and resolving , if sx continue or re-occur pt has been instructed to call us and / or return here for follow- up evaluation           Orders Placed This Encounter   Procedures    YOLA DIGITAL SCREEN W OR WO CAD BILATERAL    Hepatitis C Antibody    HIV Rapid 1&2    CBC Auto Differential    Comprehensive Metabolic Panel    Lipid Panel    Urinalysis    Vitamin D 25 Hydroxy     New Prescriptions    ALBUTEROL SULFATE HFA (VENTOLIN HFA) 108 (90 BASE) MCG/ACT INHALER    Inhale 2 puffs into the lungs 4 times daily as needed for Wheezing    AZITHROMYCIN (ZITHROMAX) 250 MG TABLET    Take 1 tablet by mouth See Admin Instructions for 5 days 500mg on day 1 followed by 250mg on days 2 - 5    PROMETHAZINE-CODEINE (PHENERGAN WITH CODEINE) 6.25-10 MG/5ML SYRUP    Take 5 mLs by mouth 2 times daily as needed for Cough for up to 3 days. There are no Patient Instructions on file for this visit. Return in about 1 year (around 3/2/2022) for Annual Physical.   EMR Dragon/transcription disclaimer:Significant part of this  encounter note is electronic transcription/translation of spoken language to printed text. The electronic translation of spoken language may beerroneous, or at times, nonsensical words or phrases may be inadvertently transcribed.  Although I have reviewed the note for such errors, some may still exist.

## 2021-03-03 ENCOUNTER — TRANSCRIBE ORDERS (OUTPATIENT)
Dept: ADMINISTRATIVE | Facility: HOSPITAL | Age: 51
End: 2021-03-03

## 2021-03-03 DIAGNOSIS — Z12.31 ENCOUNTER FOR SCREENING MAMMOGRAM FOR MALIGNANT NEOPLASM OF BREAST: Primary | ICD-10-CM

## 2021-03-12 ENCOUNTER — TELEPHONE (OUTPATIENT)
Dept: INTERNAL MEDICINE | Age: 51
End: 2021-03-12

## 2021-03-12 RX ORDER — AMOXICILLIN AND CLAVULANATE POTASSIUM 875; 125 MG/1; MG/1
1 TABLET, FILM COATED ORAL 2 TIMES DAILY
Qty: 20 TABLET | Refills: 0 | Status: SHIPPED | OUTPATIENT
Start: 2021-03-12 | End: 2021-03-22

## 2021-03-12 NOTE — TELEPHONE ENCOUNTER
PT called and LM that she is coughing up yellow still and still has head congestion. She completed the Loel Proffer and is no better she is asking for something to be sent in.

## 2021-03-25 RX ORDER — ALBUTEROL SULFATE 90 UG/1
2 AEROSOL, METERED RESPIRATORY (INHALATION) 4 TIMES DAILY PRN
Qty: 6.7 INHALER | Refills: 1 | Status: SHIPPED | OUTPATIENT
Start: 2021-03-25 | End: 2022-03-03 | Stop reason: CLARIF

## 2021-04-05 ENCOUNTER — HOSPITAL ENCOUNTER (OUTPATIENT)
Dept: GENERAL RADIOLOGY | Age: 51
Discharge: HOME | End: 2021-04-05
Payer: COMMERCIAL

## 2021-04-05 ENCOUNTER — TELEPHONE (OUTPATIENT)
Dept: INTERNAL MEDICINE | Age: 51
End: 2021-04-05

## 2021-04-05 DIAGNOSIS — R05.9 COUGH: ICD-10-CM

## 2021-04-05 DIAGNOSIS — R05.9 COUGH: Primary | ICD-10-CM

## 2021-04-05 PROCEDURE — 71046 X-RAY EXAM CHEST 2 VIEWS: CPT

## 2021-04-05 NOTE — TELEPHONE ENCOUNTER
Pt called and left  and requested call back. I have called pt back and she states that at her last appt on 3/2/2021. Dr Pippa Fournier had heard wheezing and prescribed a Zpack. Pt says rx did not seem to help with symptoms. Augmentin was then prescribed. Pt took rx as prescribed (10 days) and symptoms have not resolved. Pt reports her cough sounds worse and she is now spitting up fleam. She states she feels it in her chest and in her nose. No fevers.  Appetite is still normal.

## 2021-04-16 ENCOUNTER — OFFICE VISIT (OUTPATIENT)
Dept: INTERNAL MEDICINE | Age: 51
End: 2021-04-16
Payer: COMMERCIAL

## 2021-04-16 VITALS
OXYGEN SATURATION: 98 % | HEIGHT: 66 IN | WEIGHT: 169 LBS | BODY MASS INDEX: 27.16 KG/M2 | HEART RATE: 63 BPM | DIASTOLIC BLOOD PRESSURE: 86 MMHG | SYSTOLIC BLOOD PRESSURE: 122 MMHG

## 2021-04-16 DIAGNOSIS — R06.2 WHEEZING: ICD-10-CM

## 2021-04-16 DIAGNOSIS — R05.3 CHRONIC COUGH: Primary | ICD-10-CM

## 2021-04-16 DIAGNOSIS — Z86.16 HISTORY OF COVID-19: ICD-10-CM

## 2021-04-16 PROCEDURE — 99213 OFFICE O/P EST LOW 20 MIN: CPT | Performed by: INTERNAL MEDICINE

## 2021-04-16 ASSESSMENT — ENCOUNTER SYMPTOMS
SORE THROAT: 0
CHEST TIGHTNESS: 0
WHEEZING: 0
ABDOMINAL PAIN: 0
COUGH: 1
CONSTIPATION: 0

## 2021-04-16 NOTE — PROGRESS NOTES
Chief Complaint   Patient presents with    Cough     since november usually 6pm to bedtime     History of presenting illness:  Benita Gutiérrez is a54 y.o. female who presents today for follow up on her chronic medical conditions as noted below. Coughing since November when she had COVID  First time she discussed this with me in March 2021  Inhaler was ordered  Chest x-ray was ordered  Chest xray  Negative 4/5/21    Uses mucinex + singulair + inhaler  Used nebulizer and it seemed to help some    No sputum production    Patient Active Problem List    Diagnosis Date Noted    Hyperthyroidism 09/01/2020    Autoimmune thyroiditis 09/01/2020    Vitamin D deficiency 09/01/2020    IFG (impaired fasting glucose) 09/01/2020     Past Medical History:   Diagnosis Date    Hashimoto's thyroiditis       Past Surgical History:   Procedure Laterality Date    COLONOSCOPY  2012    dr Jerry Gonzalez, TOTAL ABDOMINAL       Current Outpatient Medications   Medication Sig Dispense Refill    mometasone-formoterol (DULERA) 100-5 MCG/ACT inhaler Inhale 2 puffs into the lungs 2 times daily 1 Inhaler 3    albuterol sulfate  (90 Base) MCG/ACT inhaler INHALE 2 PUFFS INTO THE LUNGS 4 TIMES DAILY AS NEEDED FOR WHEEZING 6.7 Inhaler 1    traZODone (DESYREL) 50 MG tablet Take 1 tablet every evening 90 tablet 3    Naltrexone HCl POWD TAKE 1 CAPSULE AT BEDTIME 90 g 3    montelukast (SINGULAIR) 10 MG tablet Take 1 tablet by mouth daily 90 tablet 3    cetirizine (ZYRTEC) 10 MG tablet Take 20 mg by mouth 2 times daily      methIMAzole (TAPAZOLE) 10 MG tablet Take 15 mg by mouth daily       fluticasone (VERAMYST) 27.5 MCG/SPRAY nasal spray 2 sprays by Each Nostril route daily      Naltrexone-Triamcinolone (NALTREXONE SC) Inject 4.5 mg into the skin Indications: for joint pain from [facility]       No current facility-administered medications for this visit.       No Known Allergies  Social History     Tobacco Use    Smoking status: Never Smoker    Smokeless tobacco: Never Used   Substance Use Topics    Alcohol use: Never     Frequency: Never      Family History   Problem Relation Age of Onset    Thyroid Disease Mother     High Blood Pressure Father     Anxiety Disorder Father     Arthritis Father     Cancer Maternal Grandmother     Diabetes Paternal Grandmother     Cancer Paternal Grandmother        Review of Systems   Constitutional: Negative for chills, fatigue and fever. HENT: Negative for congestion, ear pain, nosebleeds, postnasal drip and sore throat. Respiratory: Positive for cough. Negative for chest tightness and wheezing. Cardiovascular: Negative for chest pain, palpitations and leg swelling. Gastrointestinal: Negative for abdominal pain and constipation. Genitourinary: Negative for dysuria and urgency. Musculoskeletal: Negative. Negative for arthralgias. Skin: Negative for rash. Neurological: Negative for dizziness and headaches. Psychiatric/Behavioral: Negative. Vitals:    04/16/21 0937   BP: 122/86   Pulse: 63   SpO2: 98%   Weight: 169 lb (76.7 kg)   Height: 5' 6\" (1.676 m)     Body mass index is 27.28 kg/m². Physical Exam  Constitutional:       Appearance: She is well-developed. HENT:      Right Ear: External ear normal.      Left Ear: External ear normal.      Mouth/Throat:      Pharynx: No oropharyngeal exudate. Eyes:      Conjunctiva/sclera: Conjunctivae normal.      Pupils: Pupils are equal, round, and reactive to light. Neck:      Musculoskeletal: Neck supple. Thyroid: No thyromegaly. Vascular: No JVD. Cardiovascular:      Rate and Rhythm: Normal rate. Heart sounds: Normal heart sounds. No murmur. Pulmonary:      Effort: No respiratory distress. Breath sounds: Wheezing present. No rales. Comments: Few wheezes on chest exam are present  Chest:      Chest wall: No tenderness.    Abdominal: General: Bowel sounds are normal.      Palpations: Abdomen is soft. Lymphadenopathy:      Cervical: No cervical adenopathy. Skin:     General: Skin is warm. Findings: No rash. Neurological:      Mental Status: She is oriented to person, place, and time.          Lab Review   Orders Only on 02/26/2021   Component Date Value    Vit D, 25-Hydroxy 02/26/2021 44.1     Color, UA 02/26/2021 YELLOW     Clarity, UA 02/26/2021 Clear     Glucose, Ur 02/26/2021 Negative     Bilirubin Urine 02/26/2021 Negative     Ketones, Urine 02/26/2021 Negative     Specific Gravity, UA 02/26/2021 1.017     Blood, Urine 02/26/2021 Negative     pH, UA 02/26/2021 5.0     Protein, UA 02/26/2021 Negative     Urobilinogen, Urine 02/26/2021 0.2     Nitrite, Urine 02/26/2021 Negative     Leukocyte Esterase, Urine 02/26/2021 TRACE*    Cholesterol, Total 02/26/2021 183     Triglycerides 02/26/2021 90     HDL 02/26/2021 57*    LDL Calculated 02/26/2021 108     Sodium 02/26/2021 144     Potassium 02/26/2021 4.2     Chloride 02/26/2021 108     CO2 02/26/2021 28     Anion Gap 02/26/2021 8     Glucose 02/26/2021 90     BUN 02/26/2021 23*    CREATININE 02/26/2021 0.8     GFR Non- 02/26/2021 >60     GFR  02/26/2021 >59     Calcium 02/26/2021 9.1     Total Protein 02/26/2021 6.4*    Albumin 02/26/2021 4.3     Total Bilirubin 02/26/2021 0.5     Alkaline Phosphatase 02/26/2021 121*    ALT 02/26/2021 23     AST 02/26/2021 20     WBC 02/26/2021 3.9*    RBC 02/26/2021 4.02*    Hemoglobin 02/26/2021 13.0     Hematocrit 02/26/2021 38.7     MCV 02/26/2021 96.3     MCH 02/26/2021 32.3*    MCHC 02/26/2021 33.6     RDW 02/26/2021 12.9     Platelets 12/78/3758 245     MPV 02/26/2021 10.3     Neutrophils % 02/26/2021 48.6*    Lymphocytes % 02/26/2021 36.9     Monocytes % 02/26/2021 9.4     Eosinophils % 02/26/2021 4.3     Basophils % 02/26/2021 0.8     Neutrophils Absolute 02/26/2021 1.9     Immature Granulocytes # 02/26/2021 0.0     Lymphocytes Absolute 02/26/2021 1.5     Monocytes Absolute 02/26/2021 0.40     Eosinophils Absolute 02/26/2021 0.20     Basophils Absolute 02/26/2021 0.00     WBC, UA 02/26/2021 2-4     RBC, UA 02/26/2021 0-1     Epithelial Cells, UA 02/26/2021 3-5     Bacteria, UA 02/26/2021 Rare*           ASSESSMENT/PLAN:  Chronic cough    Wheezing    History of COVID-19  Recommendation  Trial of steroid/long beta agonist inhaler  -     mometasone-formoterol (DULERA) 200-5 MCG/ACT inhaler; Inhale 2 puffs into the lungs 2 times daily    Also, asked patient to continue Xyzal, Mucinex, Singulair    Asked her to call us back for 2-week follow-up  If cough not improving will need to have pulmonology referral/possible CT chest  Patient understands it is her responsibility to call us back in 2 weeks to report us regarding her symptoms          No orders of the defined types were placed in this encounter. New Prescriptions    MOMETASONE-FORMOTEROL (DULERA) 100-5 MCG/ACT INHALER    Inhale 2 puffs into the lungs 2 times daily         No follow-ups on file. There are no Patient Instructions on file for this visit. EMR Dragon/transcription disclaimer:Significant part of this  encounter note is electronic transcription/translationof spoken language to printed text. The electronic translation of spoken language may be erroneous, or at times, nonsensical words or phrases may be inadvertently transcribed.  Although I have reviewed the note for sucherrors, some may still exist.

## 2021-04-20 ENCOUNTER — TELEPHONE (OUTPATIENT)
Dept: INTERNAL MEDICINE | Age: 51
End: 2021-04-20

## 2021-04-20 NOTE — TELEPHONE ENCOUNTER
Pt ins does not cover Diane Bark but they do cover breo advair and symbicort which do you want to send in for her

## 2021-06-14 ENCOUNTER — HOSPITAL ENCOUNTER (OUTPATIENT)
Dept: MAMMOGRAPHY | Facility: HOSPITAL | Age: 51
Discharge: HOME | End: 2021-06-14
Admitting: INTERNAL MEDICINE

## 2021-06-14 DIAGNOSIS — Z12.31 ENCOUNTER FOR SCREENING MAMMOGRAM FOR MALIGNANT NEOPLASM OF BREAST: ICD-10-CM

## 2021-06-14 PROCEDURE — 77063 BREAST TOMOSYNTHESIS BI: CPT

## 2021-06-14 PROCEDURE — 77067 SCR MAMMO BI INCL CAD: CPT

## 2021-06-15 DIAGNOSIS — Z12.31 ENCOUNTER FOR SCREENING MAMMOGRAM FOR BREAST CANCER: ICD-10-CM

## 2021-06-15 DIAGNOSIS — N64.89 BREAST ASYMMETRY: Primary | ICD-10-CM

## 2021-06-22 ENCOUNTER — HOSPITAL ENCOUNTER (OUTPATIENT)
Dept: ULTRASOUND IMAGING | Facility: HOSPITAL | Age: 51
Discharge: HOME | End: 2021-06-22

## 2021-06-22 ENCOUNTER — HOSPITAL ENCOUNTER (OUTPATIENT)
Dept: MAMMOGRAPHY | Facility: HOSPITAL | Age: 51
Discharge: HOME | End: 2021-06-22

## 2021-06-22 DIAGNOSIS — R92.8 ABNORMAL MAMMOGRAM: ICD-10-CM

## 2021-06-22 DIAGNOSIS — N64.89 BREAST ASYMMETRY: ICD-10-CM

## 2021-06-22 PROCEDURE — G0279 TOMOSYNTHESIS, MAMMO: HCPCS

## 2021-06-22 PROCEDURE — 77065 DX MAMMO INCL CAD UNI: CPT

## 2021-06-22 PROCEDURE — 76642 ULTRASOUND BREAST LIMITED: CPT

## 2021-07-13 NOTE — TELEPHONE ENCOUNTER
Vida Yates called requesting a refill of the below medication which has been pended for you:     Requested Prescriptions     Pending Prescriptions Disp Refills    BREO ELLIPTA 200-25 MCG/INH AEPB inhaler [Pharmacy Med Name: [place] 200-25 MCG INH]  2     Sig: INHALE 2 PUFFS BY MOUTH INTO THE LUNGS DAILY       Last Appointment Date: 4/16/2021  Next Appointment Date: 3/3/2022    No Known Allergies

## 2022-01-04 RX ORDER — CHOLECALCIFEROL (VITAMIN D3) 100000/G
POWDER (GRAM) MISCELLANEOUS
Qty: 4 EACH | Refills: 3 | Status: SHIPPED | OUTPATIENT
Start: 2022-01-04

## 2022-01-04 NOTE — TELEPHONE ENCOUNTER
Wendy called requesting a refill of the below medication which has been pended for you:     Requested Prescriptions     Pending Prescriptions Disp Refills    Vitamin D3 139823 UNIT/GM POWD, Food Color Green POWD, Cellulose POWD, Empty Capsule Size 3 Pnk/Clear CAPS [Pharmacy Med Name: VITAMIN D3 25,000 U CAP] 4 each 3     Sig: TAKE 1 CAPSULE BY MOUTH ONCE WEEKLY.        Last Appointment Date: 4/16/2021  Next Appointment Date: 3/3/2022    No Known Allergies

## 2022-01-06 ENCOUNTER — TELEPHONE (OUTPATIENT)
Dept: INTERNAL MEDICINE | Age: 52
End: 2022-01-06

## 2022-01-06 RX ORDER — CIPROFLOXACIN 250 MG/1
250 TABLET, FILM COATED ORAL 2 TIMES DAILY
Qty: 10 TABLET | Refills: 0 | Status: SHIPPED | OUTPATIENT
Start: 2022-01-06 | End: 2022-01-11

## 2022-01-06 RX ORDER — PHENAZOPYRIDINE HYDROCHLORIDE 200 MG/1
200 TABLET, FILM COATED ORAL 3 TIMES DAILY PRN
Qty: 6 TABLET | Refills: 0 | Status: SHIPPED | OUTPATIENT
Start: 2022-01-06 | End: 2022-01-08

## 2022-01-06 NOTE — TELEPHONE ENCOUNTER
Information for Provider? Patient thinks she has a UTI. Urine is cloudy and has a burning sensation that stared last night. No other symptoms. Patient would like to know if the Dr can call in an antibiotic to help.

## 2022-01-06 NOTE — TELEPHONE ENCOUNTER
----- Message from Tatiana Blanco sent at 1/6/2022  8:55 AM CST -----  Subject: Message to Provider    QUESTIONS  Information for Provider? Patient thinks she has a UTI. Urine is cloudy   and has a burning sensation that stared last night. No other symptoms. Patient would like to know if the Dr can call in an antibiotic to help.  ---------------------------------------------------------------------------  --------------  CALL BACK INFO  What is the best way for the office to contact you? OK to leave message on   voicemail  Preferred Call Back Phone Number? [phone]  ---------------------------------------------------------------------------  --------------  SCRIPT ANSWERS  Relationship to Patient?  Self

## 2022-02-23 DIAGNOSIS — E03.9 HYPOTHYROIDISM (ACQUIRED): Primary | ICD-10-CM

## 2022-03-01 ENCOUNTER — HOSPITAL ENCOUNTER (OUTPATIENT)
Dept: LAB | Age: 52
Discharge: HOME | End: 2022-03-01

## 2022-03-01 DIAGNOSIS — Z12.31 ENCOUNTER FOR SCREENING MAMMOGRAM FOR BREAST CANCER: ICD-10-CM

## 2022-03-01 DIAGNOSIS — R73.01 IFG (IMPAIRED FASTING GLUCOSE): ICD-10-CM

## 2022-03-01 DIAGNOSIS — R05.9 COUGH: ICD-10-CM

## 2022-03-01 DIAGNOSIS — Z11.4 SCREENING FOR HIV (HUMAN IMMUNODEFICIENCY VIRUS): ICD-10-CM

## 2022-03-01 DIAGNOSIS — Z11.59 NEED FOR HEPATITIS C SCREENING TEST: ICD-10-CM

## 2022-03-01 DIAGNOSIS — J20.9 ACUTE BRONCHITIS AND BRONCHIOLITIS: ICD-10-CM

## 2022-03-01 DIAGNOSIS — E06.3 AUTOIMMUNE THYROIDITIS: ICD-10-CM

## 2022-03-01 DIAGNOSIS — Z00.00 ANNUAL PHYSICAL EXAM: ICD-10-CM

## 2022-03-01 DIAGNOSIS — E03.9 HYPOTHYROIDISM (ACQUIRED): ICD-10-CM

## 2022-03-01 DIAGNOSIS — J21.9 ACUTE BRONCHITIS AND BRONCHIOLITIS: ICD-10-CM

## 2022-03-01 DIAGNOSIS — F51.01 PRIMARY INSOMNIA: ICD-10-CM

## 2022-03-01 DIAGNOSIS — E55.9 VITAMIN D DEFICIENCY: ICD-10-CM

## 2022-03-01 LAB
ALBUMIN SERPL-MCNC: 4.9 G/DL (ref 3.5–5.2)
ALP BLD-CCNC: 77 U/L (ref 35–104)
ALT SERPL-CCNC: 17 U/L (ref 5–33)
ANION GAP SERPL CALCULATED.3IONS-SCNC: 14 MMOL/L (ref 7–19)
AST SERPL-CCNC: 21 U/L (ref 5–32)
BACTERIA: ABNORMAL /HPF
BASOPHILS ABSOLUTE: 0 K/UL (ref 0–0.2)
BASOPHILS RELATIVE PERCENT: 0.5 % (ref 0–1)
BILIRUB SERPL-MCNC: 0.5 MG/DL (ref 0.2–1.2)
BILIRUBIN URINE: NEGATIVE
BLOOD, URINE: NEGATIVE
BUN BLDV-MCNC: 27 MG/DL (ref 6–20)
CALCIUM SERPL-MCNC: 9.4 MG/DL (ref 8.6–10)
CHLORIDE BLD-SCNC: 106 MMOL/L (ref 98–111)
CHOLESTEROL, TOTAL: 204 MG/DL (ref 160–199)
CLARITY: CLEAR
CO2: 24 MMOL/L (ref 22–29)
COLOR: YELLOW
CREAT SERPL-MCNC: 0.8 MG/DL (ref 0.5–0.9)
CRYSTALS, UA: ABNORMAL /HPF
EOSINOPHILS ABSOLUTE: 0.1 K/UL (ref 0–0.6)
EOSINOPHILS RELATIVE PERCENT: 2.9 % (ref 0–5)
EPITHELIAL CELLS, UA: 4 /HPF (ref 0–5)
GFR AFRICAN AMERICAN: >59
GFR NON-AFRICAN AMERICAN: >60
GLUCOSE BLD-MCNC: 89 MG/DL (ref 74–109)
GLUCOSE URINE: NEGATIVE MG/DL
HCT VFR BLD CALC: 41.6 % (ref 37–47)
HDLC SERPL-MCNC: 49 MG/DL (ref 65–121)
HEMOGLOBIN: 13.8 G/DL (ref 12–16)
HEPATITIS C ANTIBODY INTERPRETATION: NORMAL
HIV-1 P24 AG: NORMAL
HYALINE CASTS: 2 /HPF (ref 0–8)
IMMATURE GRANULOCYTES #: 0 K/UL
KETONES, URINE: NEGATIVE MG/DL
LDL CHOLESTEROL CALCULATED: 131 MG/DL
LEUKOCYTE ESTERASE, URINE: ABNORMAL
LYMPHOCYTES ABSOLUTE: 1.4 K/UL (ref 1.1–4.5)
LYMPHOCYTES RELATIVE PERCENT: 34.4 % (ref 20–40)
MCH RBC QN AUTO: 32.6 PG (ref 27–31)
MCHC RBC AUTO-ENTMCNC: 33.2 G/DL (ref 33–37)
MCV RBC AUTO: 98.3 FL (ref 81–99)
MONOCYTES ABSOLUTE: 0.4 K/UL (ref 0–0.9)
MONOCYTES RELATIVE PERCENT: 8.6 % (ref 0–10)
NEUTROPHILS ABSOLUTE: 2.2 K/UL (ref 1.5–7.5)
NEUTROPHILS RELATIVE PERCENT: 53.4 % (ref 50–65)
NITRITE, URINE: NEGATIVE
PDW BLD-RTO: 11.9 % (ref 11.5–14.5)
PH UA: 5 (ref 5–8)
PLATELET # BLD: 244 K/UL (ref 130–400)
PMV BLD AUTO: 10.2 FL (ref 9.4–12.3)
POTASSIUM SERPL-SCNC: 4.5 MMOL/L (ref 3.5–5)
PROTEIN UA: NEGATIVE MG/DL
RAPID HIV 1&2: NORMAL
RBC # BLD: 4.23 M/UL (ref 4.2–5.4)
RBC UA: 2 /HPF (ref 0–4)
SODIUM BLD-SCNC: 144 MMOL/L (ref 136–145)
SPECIFIC GRAVITY UA: 1.02 (ref 1–1.03)
TOTAL PROTEIN: 7.1 G/DL (ref 6.6–8.7)
TRIGL SERPL-MCNC: 121 MG/DL (ref 0–149)
TSH SERPL DL<=0.05 MIU/L-ACNC: 0.76 UIU/ML (ref 0.27–4.2)
UROBILINOGEN, URINE: 0.2 E.U./DL
VITAMIN D 25-HYDROXY: 49 NG/ML
WBC # BLD: 4.2 K/UL (ref 4.8–10.8)
WBC UA: 12 /HPF (ref 0–5)

## 2022-03-03 ENCOUNTER — OFFICE VISIT (OUTPATIENT)
Dept: INTERNAL MEDICINE | Age: 52
End: 2022-03-03
Payer: COMMERCIAL

## 2022-03-03 ENCOUNTER — TRANSCRIBE ORDERS (OUTPATIENT)
Dept: ADMINISTRATIVE | Facility: HOSPITAL | Age: 52
End: 2022-03-03

## 2022-03-03 VITALS
HEIGHT: 66 IN | WEIGHT: 166 LBS | HEART RATE: 68 BPM | SYSTOLIC BLOOD PRESSURE: 120 MMHG | RESPIRATION RATE: 18 BRPM | DIASTOLIC BLOOD PRESSURE: 78 MMHG | BODY MASS INDEX: 26.68 KG/M2 | OXYGEN SATURATION: 97 %

## 2022-03-03 DIAGNOSIS — E89.0 POSTSURGICAL HYPOTHYROIDISM: ICD-10-CM

## 2022-03-03 DIAGNOSIS — Z12.31 ENCOUNTER FOR SCREENING MAMMOGRAM FOR MALIGNANT NEOPLASM OF BREAST: Primary | ICD-10-CM

## 2022-03-03 DIAGNOSIS — H61.21 HEARING LOSS OF RIGHT EAR DUE TO CERUMEN IMPACTION: ICD-10-CM

## 2022-03-03 DIAGNOSIS — E03.9 HYPOTHYROIDISM (ACQUIRED): ICD-10-CM

## 2022-03-03 DIAGNOSIS — E55.9 VITAMIN D DEFICIENCY: ICD-10-CM

## 2022-03-03 DIAGNOSIS — R35.0 URINARY FREQUENCY: ICD-10-CM

## 2022-03-03 DIAGNOSIS — Z12.31 ENCOUNTER FOR SCREENING MAMMOGRAM FOR BREAST CANCER: ICD-10-CM

## 2022-03-03 DIAGNOSIS — Z00.00 ANNUAL PHYSICAL EXAM: Primary | ICD-10-CM

## 2022-03-03 DIAGNOSIS — R73.01 IFG (IMPAIRED FASTING GLUCOSE): ICD-10-CM

## 2022-03-03 DIAGNOSIS — F51.01 PRIMARY INSOMNIA: ICD-10-CM

## 2022-03-03 PROCEDURE — 99396 PREV VISIT EST AGE 40-64: CPT | Performed by: INTERNAL MEDICINE

## 2022-03-03 PROCEDURE — 69210 REMOVE IMPACTED EAR WAX UNI: CPT | Performed by: INTERNAL MEDICINE

## 2022-03-03 RX ORDER — IBUPROFEN, ACETAMINOPHEN 125; 250 MG/1; MG/1
TABLET, FILM COATED ORAL 2 TIMES DAILY
COMMUNITY

## 2022-03-03 RX ORDER — LEVOTHYROXINE SODIUM 0.1 MG/1
TABLET ORAL
COMMUNITY
Start: 2022-01-27

## 2022-03-03 ASSESSMENT — ENCOUNTER SYMPTOMS
ABDOMINAL PAIN: 0
CHEST TIGHTNESS: 0
COUGH: 0
WHEEZING: 0
SORE THROAT: 0
CONSTIPATION: 0

## 2022-03-03 ASSESSMENT — PATIENT HEALTH QUESTIONNAIRE - PHQ9
SUM OF ALL RESPONSES TO PHQ QUESTIONS 1-9: 0
SUM OF ALL RESPONSES TO PHQ9 QUESTIONS 1 & 2: 0
SUM OF ALL RESPONSES TO PHQ QUESTIONS 1-9: 0
2. FEELING DOWN, DEPRESSED OR HOPELESS: 0
SUM OF ALL RESPONSES TO PHQ QUESTIONS 1-9: 0
SUM OF ALL RESPONSES TO PHQ QUESTIONS 1-9: 0
1. LITTLE INTEREST OR PLEASURE IN DOING THINGS: 0

## 2022-03-03 ASSESSMENT — SOCIAL DETERMINANTS OF HEALTH (SDOH): HOW HARD IS IT FOR YOU TO PAY FOR THE VERY BASICS LIKE FOOD, HOUSING, MEDICAL CARE, AND HEATING?: NOT HARD AT ALL

## 2022-03-03 NOTE — PROGRESS NOTES
Chief Complaint:   Kiana Watkins is a 54 y.o. female who presents forcomplete physical exam.    History of Present Illness:      Kiana Watkins is a 54 y.o. female who presents todayfor wellness visit AND follow up on her chronic medical conditions as noted below. Patient Active Problem List    Diagnosis Date Noted    Hyperthyroidism 09/01/2020    Autoimmune thyroiditis 09/01/2020    Vitamin D deficiency 09/01/2020    IFG (impaired fasting glucose) 09/01/2020       Past Medical History:   Diagnosis Date    Hashimoto's thyroiditis        Past Surgical History:   Procedure Laterality Date    COLONOSCOPY  2012    dr Felipe Cole, TOTAL ABDOMINAL         Current Outpatient Medications   Medication Sig Dispense Refill    levothyroxine (SYNTHROID) 100 MCG tablet TAKE 1 TABLET BY MOUTH EVERY DAY IN THE MORNING      Ibuprofen-Acetaminophen (ADVIL DUAL ACTION) 125-250 MG TABS Take by mouth in the morning and at bedtime      Vitamin D3 087756 UNIT/GM POWD, Food Color Green POWD, Cellulose POWD, Empty Capsule Size 3 Pnk/Clear CAPS TAKE 1 CAPSULE BY MOUTH ONCE WEEKLY. 4 each 3    BREO ELLIPTA 200-25 MCG/INH AEPB inhaler INHALE 2 PUFFS BY MOUTH INTO THE LUNGS DAILY 1 each 2    mometasone-formoterol (DULERA) 200-5 MCG/ACT inhaler Inhale 2 puffs into the lungs every 12 hours 1 Inhaler 5    traZODone (DESYREL) 50 MG tablet Take 1 tablet every evening 90 tablet 3    Naltrexone HCl POWD TAKE 1 CAPSULE AT BEDTIME 90 g 3    montelukast (SINGULAIR) 10 MG tablet Take 1 tablet by mouth daily 90 tablet 3    cetirizine (ZYRTEC) 10 MG tablet Take 20 mg by mouth 2 times daily      fluticasone (VERAMYST) 27.5 MCG/SPRAY nasal spray 2 sprays by Each Nostril route daily      Naltrexone-Triamcinolone (NALTREXONE SC) Inject 4.5 mg into the skin Indications: for joint pain from [place]       No current facility-administered medications for this visit.      No Known Pressure Father     Anxiety Disorder Father     Arthritis Father     Cancer Maternal Grandmother     Diabetes Paternal Grandmother     Cancer Paternal Grandmother           Past Surgical History:   Procedure Laterality Date    COLONOSCOPY  2012    dr Kathryn Patel, TOTAL ABDOMINAL           Lab Review   Orders Only on 03/01/2022   Component Date Value    Hep C Ab Interp 03/01/2022 Non-Reactive     Rapid HIV 1&2 03/01/2022 Non-reactive     HIV-1 P24 Ag 03/01/2022 Non-reactive     WBC 03/01/2022 4.2*    RBC 03/01/2022 4.23     Hemoglobin 03/01/2022 13.8     Hematocrit 03/01/2022 41.6     MCV 03/01/2022 98.3     MCH 03/01/2022 32.6*    MCHC 03/01/2022 33.2     RDW 03/01/2022 11.9     Platelets 31/88/5359 244     MPV 03/01/2022 10.2     Neutrophils % 03/01/2022 53.4     Lymphocytes % 03/01/2022 34.4     Monocytes % 03/01/2022 8.6     Eosinophils % 03/01/2022 2.9     Basophils % 03/01/2022 0.5     Neutrophils Absolute 03/01/2022 2.2     Immature Granulocytes # 03/01/2022 0.0     Lymphocytes Absolute 03/01/2022 1.4     Monocytes Absolute 03/01/2022 0.40     Eosinophils Absolute 03/01/2022 0.10     Basophils Absolute 03/01/2022 0.00     Sodium 03/01/2022 144     Potassium 03/01/2022 4.5     Chloride 03/01/2022 106     CO2 03/01/2022 24     Anion Gap 03/01/2022 14     Glucose 03/01/2022 89     BUN 03/01/2022 27*    CREATININE 03/01/2022 0.8     GFR Non- 03/01/2022 >60     GFR  03/01/2022 >59     Calcium 03/01/2022 9.4     Total Protein 03/01/2022 7.1     Albumin 03/01/2022 4.9     Total Bilirubin 03/01/2022 0.5     Alkaline Phosphatase 03/01/2022 77     ALT 03/01/2022 17     AST 03/01/2022 21     Cholesterol, Total 03/01/2022 204*    Triglycerides 03/01/2022 121     HDL 03/01/2022 49*    LDL Calculated 03/01/2022 131     Color, UA 03/01/2022 YELLOW     Clarity, UA 03/01/2022 Clear     Glucose, Ur 03/01/2022 Negative     Bilirubin Urine 03/01/2022 Negative     Ketones, Urine 03/01/2022 Negative     Specific Gravity, UA 03/01/2022 1.020     Blood, Urine 03/01/2022 Negative     pH, UA 03/01/2022 5.0     Protein, UA 03/01/2022 Negative     Urobilinogen, Urine 03/01/2022 0.2     Nitrite, Urine 03/01/2022 Negative     Leukocyte Esterase, Urine 03/01/2022 MODERATE*    Vit D, 25-Hydroxy 03/01/2022 49.0     TSH 03/01/2022 0.755     Bacteria, UA 03/01/2022 TRACE*    Crystals, UA 03/01/2022 NEG*    Hyaline Casts, UA 03/01/2022 2     WBC, UA 03/01/2022 12*    RBC, UA 03/01/2022 2     Epithelial Cells, UA 03/01/2022 4          Review of Systems   Constitutional: Negative for chills, fatigue and fever. HENT: Negative for congestion, ear pain, nosebleeds, postnasal drip and sore throat. Respiratory: Negative for cough, chest tightness and wheezing. Cardiovascular: Negative for chest pain, palpitations and leg swelling. Gastrointestinal: Negative for abdominal pain and constipation. Genitourinary: Negative for dysuria and urgency. Musculoskeletal: Negative. Negative for arthralgias. Skin: Negative for rash. Neurological: Negative for dizziness and headaches. Psychiatric/Behavioral: Negative. Vitals:    03/03/22 0906   BP: 120/78   Site: Left Upper Arm   Position: Sitting   Cuff Size: Large Adult   Pulse: 68   Resp: 18   SpO2: 97%   Weight: 166 lb (75.3 kg)   Height: 5' 6\" (1.676 m)      Wt Readings from Last 3 Encounters:   03/03/22 166 lb (75.3 kg)   04/16/21 169 lb (76.7 kg)   03/02/21 171 lb (77.6 kg)   Body mass index is 26.79 kg/m². BP Readings from Last 3 Encounters:   03/03/22 120/78   04/16/21 122/86   03/02/21 122/80       Physical Exam  Constitutional:       Appearance: She is well-developed. HENT:      Right Ear: External ear normal. There is impacted cerumen.       Left Ear: External ear normal.      Mouth/Throat:      Pharynx: No oropharyngeal exudate. Eyes:      Conjunctiva/sclera: Conjunctivae normal.      Pupils: Pupils are equal, round, and reactive to light. Neck:      Thyroid: No thyromegaly. Vascular: No JVD. Cardiovascular:      Rate and Rhythm: Normal rate. Heart sounds: Normal heart sounds. No murmur heard. Pulmonary:      Effort: No respiratory distress. Breath sounds: Normal breath sounds. No wheezing or rales. Chest:      Chest wall: No tenderness. Abdominal:      General: Bowel sounds are normal.      Palpations: Abdomen is soft. Musculoskeletal:      Cervical back: Neck supple. Lymphadenopathy:      Cervical: No cervical adenopathy. Skin:     Findings: No rash.            ASSESSMENT/PLAN      Annual physical    * mammo order placed  * pap NA   (HO endometriosis  Post total hysterectomy  Developed bleeding post hysterctomy- per gyn had endometriosis implant left- since then no issues  Does not need further pap's)    Hyperlipidemia  * LDL is  131 in 2/2022  Prior :108-   Healthy, mostly fiber rich nonstarchy plant-based diet recommended  Recommend to decrease intake of processed foods, simple carbohydrates and animal-based products that high in saturated fats    Hypothyroidism  Post total thyroidectomy 8/2021  TSH 2/2022 0.7  TSH 0.35 in 1/2022  TSH 1.9 in 10/2022  Synthroid dose decrease form 112 to 100 around 1/3/2022       Sleeping issues on and off  She takes trazodone at nighttime to help her sleep     Issues with allergies she stays on Flonase no spray, Allegra and Singulair.  Currently her symptoms mostly have been controlled     Has issues with knee pains  Has been to PT  Some better     Vitamin D deficiency  Level 44  Take vit d every other week summers/ every week winter    Urinary frequency  obtain ua + cx      Cerumen impaction right ear  Ear lavage performed  Ear syringe and cerumen spoon use  No complications           Orders Placed This Encounter   Procedures    Culture, Urine    YOLA DIGITAL SCREEN W OR WO CAD BILATERAL    Comprehensive Metabolic Panel    CBC with Auto Differential    Lipid Panel    Urinalysis    TSH    TSH    T4, Free    T4, Free    Urinalysis    Ear wax removal     New Prescriptions    No medications on file      There are no Patient Instructions on file for this visit. Return in about 1 year (around 3/3/2023) for Annual Physical.   EMR Dragon/transcription disclaimer:Significant part of this  encounter note is electronic transcription/translation of spoken language to printed text. The electronic translation of spoken language may beerroneous, or at times, nonsensical words or phrases may be inadvertently transcribed.  Although I have reviewed the note for such errors, some may still exist.

## 2022-03-05 LAB
ORGANISM: ABNORMAL
URINE CULTURE, ROUTINE: ABNORMAL
URINE CULTURE, ROUTINE: ABNORMAL

## 2022-03-08 ENCOUNTER — TELEPHONE (OUTPATIENT)
Dept: INTERNAL MEDICINE | Age: 52
End: 2022-03-08

## 2022-03-08 RX ORDER — CIPROFLOXACIN 250 MG/1
250 TABLET, FILM COATED ORAL 2 TIMES DAILY
Qty: 10 TABLET | Refills: 0 | Status: SHIPPED | OUTPATIENT
Start: 2022-03-08 | End: 2022-03-13

## 2022-03-11 ENCOUNTER — PATIENT MESSAGE (OUTPATIENT)
Dept: INTERNAL MEDICINE | Age: 52
End: 2022-03-11

## 2022-03-11 RX ORDER — FEXOFENADINE HCL 180 MG/1
180 TABLET ORAL DAILY
Qty: 30 TABLET | Refills: 1 | Status: SHIPPED | OUTPATIENT
Start: 2022-03-11 | End: 2022-03-15

## 2022-03-11 NOTE — TELEPHONE ENCOUNTER
From: Uvaldo Eisenmenger  To: Dr. Doc Cormier  Sent: 3/11/2022 12:58 PM CST  Subject: Prescription Order    Can Dr Weathers Quiet call me in a script for Allegra D 12 hour? I need to take this on a regular basis and I can never buy a full months worth over the counter.  THANKS

## 2022-03-15 ENCOUNTER — TELEPHONE (OUTPATIENT)
Dept: INTERNAL MEDICINE | Age: 52
End: 2022-03-15

## 2022-03-15 DIAGNOSIS — R30.0 DYSURIA: ICD-10-CM

## 2022-03-15 DIAGNOSIS — R30.0 DYSURIA: Primary | ICD-10-CM

## 2022-03-15 LAB
BACTERIA: ABNORMAL /HPF
BILIRUBIN URINE: NEGATIVE
BLOOD, URINE: NEGATIVE
CLARITY: CLEAR
COLOR: YELLOW
CRYSTALS, UA: ABNORMAL /HPF
EPITHELIAL CELLS, UA: 1 /HPF (ref 0–5)
GLUCOSE URINE: NEGATIVE MG/DL
HYALINE CASTS: 0 /HPF (ref 0–8)
KETONES, URINE: NEGATIVE MG/DL
LEUKOCYTE ESTERASE, URINE: ABNORMAL
NITRITE, URINE: NEGATIVE
PH UA: 5.5 (ref 5–8)
PROTEIN UA: NEGATIVE MG/DL
RBC UA: 1 /HPF (ref 0–4)
SPECIFIC GRAVITY UA: 1.01 (ref 1–1.03)
UROBILINOGEN, URINE: 0.2 E.U./DL
WBC UA: 4 /HPF (ref 0–5)

## 2022-03-15 RX ORDER — FEXOFENADINE HCL AND PSEUDOEPHEDRINE HCI 180; 240 MG/1; MG/1
1 TABLET, EXTENDED RELEASE ORAL DAILY
Qty: 90 TABLET | Refills: 1 | Status: SHIPPED | OUTPATIENT
Start: 2022-03-15 | End: 2022-09-19

## 2022-03-15 NOTE — TELEPHONE ENCOUNTER
Griselda Santoro called requesting a refill of the below medication which has been pended for you:     Requested Prescriptions     Pending Prescriptions Disp Refills    fexofenadine-pseudoephedrine (ALLEGRA-D 24HR) 180-240 MG per extended release tablet 90 tablet 1     Sig: Take 1 tablet by mouth daily       Last Appointment Date: 3/3/2022  Next Appointment Date: Visit date not found    No Known Allergies

## 2022-03-17 RX ORDER — NITROFURANTOIN 25; 75 MG/1; MG/1
100 CAPSULE ORAL 2 TIMES DAILY
Qty: 16 CAPSULE | Refills: 0 | Status: SHIPPED | OUTPATIENT
Start: 2022-03-17 | End: 2022-03-25

## 2022-03-17 NOTE — TELEPHONE ENCOUNTER
Urine cx shows growth of E coli  Growth is small ( same bacteria as on prior culture )-   Proceed with macrobid 100 bid for 8 days

## 2022-03-18 LAB
ORGANISM: ABNORMAL
URINE CULTURE, ROUTINE: ABNORMAL
URINE CULTURE, ROUTINE: ABNORMAL

## 2022-05-13 NOTE — PROGRESS NOTES
Chief Complaint   Patient presents with    Cough     Started wednesday afternoon    Otalgia    Dizziness     History of presenting illness:  Aneta Chirinos is a54 y.o. female     TELEHEALTH EVALUATION -- Audio/Visual (During KAEHM-21 public health emergency)  Patient location-home  Pursuant to the emergency declaration under the [facility] [address] authority and the [employer] Act, this Virtual  Visit was conducted, with patient's consent, to reduce the patient's risk of exposure to COVID-19 and provide continuity of care for an established patient. Services were provided through a video synchronous discussion virtually to substitute for in-person clinic visit. Sx of congestion  Watery eyes  Sinus pressure  Deep cough  Sx started 11/11/20  Dizzy     ++ child  No fever  ++ sore thraot    No taste changes      Patient Active Problem List    Diagnosis Date Noted    Hyperthyroidism 09/01/2020    Autoimmune thyroiditis 09/01/2020    Vitamin D deficiency 09/01/2020    IFG (impaired fasting glucose) 09/01/2020     Past Medical History:   Diagnosis Date    Hashimoto's thyroiditis       Past Surgical History:   Procedure Laterality Date    COLONOSCOPY  2012    dr Earl Lang, TOTAL ABDOMINAL       Current Outpatient Medications   Medication Sig Dispense Refill    azithromycin (ZITHROMAX) 250 MG tablet Take 1 tablet by mouth See Admin Instructions for 5 days 500mg on day 1 followed by 250mg on days 2 - 5 6 tablet 0    predniSONE (DELTASONE) 10 MG tablet Take 1 tablet by mouth 2 times daily for 3 days 6 tablet 0    promethazine-codeine (PHENERGAN WITH CODEINE) 6.25-10 MG/5ML syrup Take 5 mLs by mouth 2 times daily as needed for Cough for up to 14 days.  100 mL 0    cetirizine (ZYRTEC) 10 MG tablet Take 20 mg by mouth 2 times daily      traZODone (DESYREL) 50 Left message regarding post op call and if there were any complications from procedure, instructed patient to call office for any questions or concerns at [phone]   MG tablet TAKE 1 TABLET BY MOUTH EVERYDAY AT BEDTIME 90 tablet 1    montelukast (SINGULAIR) 10 MG tablet Take 1 tablet by mouth daily 90 tablet 3    methIMAzole (TAPAZOLE) 10 MG tablet Take 15 mg by mouth daily       fluticasone (VERAMYST) 27.5 MCG/SPRAY nasal spray 2 sprays by Each Nostril route daily      Naltrexone-Triamcinolone (NALTREXONE SC) Inject 4.5 mg into the skin Indications: for joint pain from [facility]       No current facility-administered medications for this visit. No Known Allergies  Social History     Tobacco Use    Smoking status: Never Smoker    Smokeless tobacco: Never Used   Substance Use Topics    Alcohol use: Never     Frequency: Never      Family History   Problem Relation Age of Onset    Thyroid Disease Mother     High Blood Pressure Father     Anxiety Disorder Father     Arthritis Father     Cancer Maternal Grandmother     Diabetes Paternal Grandmother     Cancer Paternal Grandmother        Review of Systems   Constitutional: Negative for chills, fatigue and fever. HENT: Positive for congestion, postnasal drip, sinus pressure and sore throat. Negative for ear pain and nosebleeds. Respiratory: Positive for cough and wheezing. Negative for chest tightness. Cardiovascular: Negative for chest pain, palpitations and leg swelling. Gastrointestinal: Negative for abdominal pain and constipation. Genitourinary: Negative for dysuria and urgency. Musculoskeletal: Negative. Negative for arthralgias. Skin: Negative for rash. Neurological: Negative for dizziness and headaches. Psychiatric/Behavioral: Negative. There were no vitals filed for this visit. There is no height or weight on file to calculate BMI.   Patient-Reported Vitals 11/13/2020   Patient-Reported Weight 155   Patient-Reported Height 56   Patient-Reported Systolic 371   Patient-Reported Diastolic 61   Patient-Reported Pulse 75   Patient-Reported Temperature 98.2        Physical Exam PHYSICAL EXAMINATION:  [ INSTRUCTIONS:  \"[x]\" Indicates a positive item  \"[]\" Indicates a negative item  -- DELETE ALL ITEMS NOT EXAMINED]  [x] Alert  [x] Oriented to person/place/time    [x] No apparent distress  [] Toxic appearing    [] Face flushed appearing [] Sclera clear  [] Lips are cyanotic      [x] Breathing appears normal  [] Appears tachypneic      [] Rash on visible skin    [x] Cranial Nerves II-XII grossly intact    [x] Motor grossly intact in visible upper extremities    [x] Motor grossly intact in visible lower extremities    [x] Normal Mood  [] Anxious appearing    [] Depressed appearing  [] Confused appearing      [] Poor short term memory  [] Poor long term memory    [] OTHER:      Due to this being a TeleHealth encounter, evaluation of the following organ systems is limited: Vitals/Constitutional/EENT/Resp/CV/GI//MS/Neuro/Skin/Heme-Lymph-Imm. Lab Review   No visits with results within 2 Month(s) from this visit.    Latest known visit with results is:   Orders Only on 09/01/2020   Component Date Value    Hemoglobin A1C 09/01/2020 4.9     T3, Free 09/01/2020 2.6     T4 Free 09/01/2020 0.69*    TSH 09/01/2020 10.180*    WBC 09/01/2020 4.2*    RBC 09/01/2020 4.50     Hemoglobin 09/01/2020 13.8     Hematocrit 09/01/2020 41.5     MCV 09/01/2020 92.2     MCH 09/01/2020 30.7     MCHC 09/01/2020 33.3     RDW 09/01/2020 14.0     Platelets 08/21/5177 279     MPV 09/01/2020 9.9     Neutrophils % 09/01/2020 56.0     Lymphocytes % 09/01/2020 34.7     Monocytes % 09/01/2020 8.9     Eosinophils % 09/01/2020 0.2     Basophils % 09/01/2020 0.0     Neutrophils Absolute 09/01/2020 2.3     Immature Granulocytes # 09/01/2020 0.0     Lymphocytes Absolute 09/01/2020 1.4     Monocytes Absolute 09/01/2020 0.40     Eosinophils Absolute 09/01/2020 0.00     Basophils Absolute 09/01/2020 0.00            ASSESSMENT/PLAN:    Acute bronchitis and bronchiolitis  -    Cough    Acute sinusitis  - azithromycin (ZITHROMAX) 250 MG tablet; Take 1 tablet by mouth See Admin Instructions for 5 days 500mg on day 1 followed by 250mg on days 2 - 5  -     promethazine-codeine (PHENERGAN WITH CODEINE) 6.25-10 MG/5ML syrup; Take 5 mLs by mouth 2 times daily as needed for Cough for up to 14 days. -     predniSONE (DELTASONE) 10 MG tablet; Take 1 tablet by mouth 2 times daily for 3 days    Patient will continue Zyrtec current dose  She will have Covid testing done at local hospital [facility]    If sx not improving and resolving , if sx continue or re-occur pt has been instructed to call us and / or return here for follow- up evaluation    No orders of the defined types were placed in this encounter. New Prescriptions    AZITHROMYCIN (ZITHROMAX) 250 MG TABLET    Take 1 tablet by mouth See Admin Instructions for 5 days 500mg on day 1 followed by 250mg on days 2 - 5    PREDNISONE (DELTASONE) 10 MG TABLET    Take 1 tablet by mouth 2 times daily for 3 days    PROMETHAZINE-CODEINE (PHENERGAN WITH CODEINE) 6.25-10 MG/5ML SYRUP    Take 5 mLs by mouth 2 times daily as needed for Cough for up to 14 days. No follow-ups on file. There are no Patient Instructions on file for this visit. EMR Dragon/transcription disclaimer:Significant part of this  encounter note is electronic transcription/translationof spoken language to printed text. The electronic translation of spoken language may be erroneous, or at times, nonsensical words or phrases may be inadvertently transcribed.  Although I have reviewed the note for sucherrors, some may still exist.

## 2022-05-24 RX ORDER — MONTELUKAST SODIUM 10 MG/1
TABLET ORAL
Qty: 90 TABLET | Refills: 3 | Status: SHIPPED | OUTPATIENT
Start: 2022-05-24

## 2022-05-24 NOTE — TELEPHONE ENCOUNTER
Jay Molina called requesting a refill of the below medication which has been pended for you:     Requested Prescriptions     Pending Prescriptions Disp Refills    montelukast (SINGULAIR) 10 MG tablet [Pharmacy Med Name: MONTELUKAST SOD 10 MG TABLET] 90 tablet 3     Sig: TAKE 1 TABLET BY MOUTH EVERY DAY       Last Appointment Date: 3/3/2022  Next Appointment Date: Visit date not found    No Known Allergies

## 2022-05-26 RX ORDER — TRAZODONE HYDROCHLORIDE 50 MG/1
TABLET ORAL
Qty: 90 TABLET | Refills: 0 | Status: SHIPPED | OUTPATIENT
Start: 2022-05-26 | End: 2022-08-22

## 2022-05-26 NOTE — TELEPHONE ENCOUNTER
Rosa Love called requesting a refill of the below medication which has been pended for you:     Requested Prescriptions     Pending Prescriptions Disp Refills    traZODone (DESYREL) 50 MG tablet [Pharmacy Med Name: TRAZODONE 50 MG TABLET] 90 tablet 3     Sig: TAKE 1 126 [facility]       Last Appointment Date: 3/3/2022  Next Appointment Date: Visit date not found    No Known Allergies

## 2022-06-17 ENCOUNTER — HOSPITAL ENCOUNTER (OUTPATIENT)
Dept: MAMMOGRAPHY | Facility: HOSPITAL | Age: 52
Discharge: HOME | End: 2022-06-17
Admitting: INTERNAL MEDICINE

## 2022-06-17 DIAGNOSIS — E89.0 POSTSURGICAL HYPOTHYROIDISM: ICD-10-CM

## 2022-06-17 DIAGNOSIS — Z12.31 ENCOUNTER FOR SCREENING MAMMOGRAM FOR MALIGNANT NEOPLASM OF BREAST: ICD-10-CM

## 2022-06-17 LAB
T4 FREE: 1.51 NG/DL (ref 0.93–1.7)
TSH SERPL DL<=0.05 MIU/L-ACNC: 3.01 UIU/ML (ref 0.27–4.2)

## 2022-06-17 PROCEDURE — 77063 BREAST TOMOSYNTHESIS BI: CPT

## 2022-06-17 PROCEDURE — 77067 SCR MAMMO BI INCL CAD: CPT

## 2022-06-20 DIAGNOSIS — Z12.31 ENCOUNTER FOR SCREENING MAMMOGRAM FOR BREAST CANCER: ICD-10-CM

## 2022-07-14 ENCOUNTER — OFFICE VISIT (OUTPATIENT)
Dept: INTERNAL MEDICINE | Age: 52
End: 2022-07-14
Payer: COMMERCIAL

## 2022-07-14 VITALS
WEIGHT: 161 LBS | TEMPERATURE: 97.7 F | BODY MASS INDEX: 25.88 KG/M2 | HEIGHT: 66 IN | OXYGEN SATURATION: 99 % | HEART RATE: 78 BPM | DIASTOLIC BLOOD PRESSURE: 78 MMHG | SYSTOLIC BLOOD PRESSURE: 120 MMHG

## 2022-07-14 DIAGNOSIS — R30.0 DYSURIA: ICD-10-CM

## 2022-07-14 DIAGNOSIS — R35.0 URINARY FREQUENCY: ICD-10-CM

## 2022-07-14 DIAGNOSIS — N34.2 INFECTIVE URETHRITIS: Primary | ICD-10-CM

## 2022-07-14 LAB
APPEARANCE FLUID: CLEAR
BILIRUBIN, POC: NORMAL
BLOOD URINE, POC: NORMAL
CLARITY, POC: CLEAR
COLOR, POC: YELLOW
GLUCOSE URINE, POC: NORMAL
KETONES, POC: NORMAL
LEUKOCYTE EST, POC: NORMAL
NITRITE, POC: NORMAL
PH, POC: 7
PROTEIN, POC: NORMAL
SPECIFIC GRAVITY, POC: 1.01
UROBILINOGEN, POC: 0.2

## 2022-07-14 PROCEDURE — 81002 URINALYSIS NONAUTO W/O SCOPE: CPT | Performed by: INTERNAL MEDICINE

## 2022-07-14 PROCEDURE — 99213 OFFICE O/P EST LOW 20 MIN: CPT | Performed by: INTERNAL MEDICINE

## 2022-07-14 RX ORDER — PHENAZOPYRIDINE HYDROCHLORIDE 200 MG/1
200 TABLET, FILM COATED ORAL 3 TIMES DAILY PRN
Qty: 6 TABLET | Refills: 0 | Status: SHIPPED | OUTPATIENT
Start: 2022-07-14 | End: 2022-07-17

## 2022-07-14 RX ORDER — CEFUROXIME AXETIL 250 MG/1
250 TABLET ORAL 2 TIMES DAILY
Qty: 14 TABLET | Refills: 0 | Status: SHIPPED | OUTPATIENT
Start: 2022-07-14 | End: 2022-07-21

## 2022-07-14 SDOH — ECONOMIC STABILITY: FOOD INSECURITY: WITHIN THE PAST 12 MONTHS, THE FOOD YOU BOUGHT JUST DIDN'T LAST AND YOU DIDN'T HAVE MONEY TO GET MORE.: NEVER TRUE

## 2022-07-14 SDOH — ECONOMIC STABILITY: FOOD INSECURITY: WITHIN THE PAST 12 MONTHS, YOU WORRIED THAT YOUR FOOD WOULD RUN OUT BEFORE YOU GOT MONEY TO BUY MORE.: NEVER TRUE

## 2022-07-14 ASSESSMENT — ENCOUNTER SYMPTOMS
COUGH: 0
WHEEZING: 0
CONSTIPATION: 0
SORE THROAT: 0
CHEST TIGHTNESS: 0
ABDOMINAL PAIN: 0

## 2022-07-14 NOTE — PROGRESS NOTES
Chief Complaint   Patient presents with    Urinary Tract Infection     Having chills // burning and itching when urinating and stomach cramping      History of presenting illness:  Elieser Diaz is a53 y.o. female who presents today for follow up on her chronic medical conditions as noted below. Patient Active Problem List    Diagnosis Date Noted    Hyperthyroidism 09/01/2020    Autoimmune thyroiditis 09/01/2020    Vitamin D deficiency 09/01/2020    IFG (impaired fasting glucose) 09/01/2020     Past Medical History:   Diagnosis Date    Hashimoto's thyroiditis       Past Surgical History:   Procedure Laterality Date    COLONOSCOPY  2012    dr Bryant Floor, TOTAL ABDOMINAL (CERVIX REMOVED)       Current Outpatient Medications   Medication Sig Dispense Refill    omalizumab (XOLAIR) 150 MG/ML SOSY injection Inject 300 mg into the skin every 28 days      phenazopyridine (PYRIDIUM) 200 MG tablet Take 1 tablet by mouth 3 times daily as needed for Pain 6 tablet 0    cefUROXime (CEFTIN) 250 MG tablet Take 1 tablet by mouth 2 times daily for 7 days 14 tablet 0    traZODone (DESYREL) 50 MG tablet TAKE 1 TABLET BY MOUTH EVERY DAY IN THE EVENING 90 tablet 0    montelukast (SINGULAIR) 10 MG tablet TAKE 1 TABLET BY MOUTH EVERY DAY 90 tablet 3    fexofenadine-pseudoephedrine (ALLEGRA-D 24HR) 180-240 MG per extended release tablet Take 1 tablet by mouth daily 90 tablet 1    levothyroxine (SYNTHROID) 100 MCG tablet TAKE 1 TABLET BY MOUTH EVERY DAY IN THE MORNING      Ibuprofen-Acetaminophen (ADVIL DUAL ACTION) 125-250 MG TABS Take by mouth in the morning and at bedtime      Vitamin D3 107272 UNIT/GM POWD, Food Color Green POWD, Cellulose POWD, Empty Capsule Size 3 Pnk/Clear CAPS TAKE 1 CAPSULE BY MOUTH ONCE WEEKLY.  4 each 3    BREO ELLIPTA 200-25 MCG/INH AEPB inhaler INHALE 2 PUFFS BY MOUTH INTO THE LUNGS DAILY 1 each 2    mometasone-formoterol (Estella García) Conjunctiva/sclera: Conjunctivae normal.      Pupils: Pupils are equal, round, and reactive to light. Neck:      Thyroid: No thyromegaly. Vascular: No JVD. Cardiovascular:      Rate and Rhythm: Normal rate. Heart sounds: Normal heart sounds. No murmur heard. Pulmonary:      Effort: No respiratory distress. Breath sounds: Normal breath sounds. No wheezing or rales. Chest:      Chest wall: No tenderness. Abdominal:      General: Bowel sounds are normal.      Palpations: Abdomen is soft. Musculoskeletal:         General: Normal range of motion. Cervical back: Neck supple. Lymphadenopathy:      Cervical: No cervical adenopathy. Skin:     General: Skin is warm. Findings: No rash. Neurological:      Mental Status: She is oriented to person, place, and time. Lab Review   Orders Only on 06/17/2022   Component Date Value    T4 Free 06/17/2022 1.51     TSH 06/17/2022 3.010            ASSESSMENT/PLAN:  Lupillo Martin was seen today for urinary tract infection. Diagnoses and all orders for this visit:    Infective urethritis    Dysuria    Urinary frequency  Obtain UA and culture  Previously patient in March 2022 had E. coli that was resistant to several tested antibiotics  Will Rx as below until I review urine culture results in 2 days    -     phenazopyridine (PYRIDIUM) 200 MG tablet; Take 1 tablet by mouth 3 times daily as needed for Pain      -     cefUROXime (CEFTIN) 250 MG tablet; Take 1 tablet by mouth 2 times daily for 7 days      Obtain    -     Culture, Urine; Future      Orders Placed This Encounter   Procedures    Culture, Urine    POCT Urinalysis no Micro     New Prescriptions    CEFUROXIME (CEFTIN) 250 MG TABLET    Take 1 tablet by mouth 2 times daily for 7 days    PHENAZOPYRIDINE (PYRIDIUM) 200 MG TABLET    Take 1 tablet by mouth 3 times daily as needed for Pain         No follow-ups on file. There are no Patient Instructions on file for this visit.   EMR Dragon/transcription disclaimer:Significant part of this  encounter note is electronic transcription/translationof spoken language to printed text. The electronic translation of spoken language may be erroneous, or at times, nonsensical words or phrases may be inadvertently transcribed.  Although I have reviewed the note for sucherrors, some may still exist.

## 2022-07-16 LAB
ORGANISM: ABNORMAL
URINE CULTURE, ROUTINE: ABNORMAL
URINE CULTURE, ROUTINE: ABNORMAL

## 2022-08-22 RX ORDER — TRAZODONE HYDROCHLORIDE 50 MG/1
TABLET ORAL
Qty: 90 TABLET | Refills: 0 | Status: SHIPPED | OUTPATIENT
Start: 2022-08-22

## 2022-08-22 NOTE — TELEPHONE ENCOUNTER
Felicia Flores called requesting a refill of the below medication which has been pended for you:     Requested Prescriptions     Pending Prescriptions Disp Refills    traZODone (DESYREL) 50 MG tablet [Pharmacy Med Name: TRAZODONE 50 MG TABLET] 90 tablet 0     Sig: TAKE 1 126 [facility]       Last Appointment Date: 7/14/2022  Next Appointment Date: Visit date not found    No Known Allergies

## 2022-08-30 ENCOUNTER — PATIENT MESSAGE (OUTPATIENT)
Dept: INTERNAL MEDICINE | Age: 52
End: 2022-08-30

## 2022-08-30 DIAGNOSIS — E03.9 HYPOTHYROIDISM (ACQUIRED): Primary | ICD-10-CM

## 2022-08-30 NOTE — TELEPHONE ENCOUNTER
From: Keely Ladd  To: Dr. Wise Dadds: 8/30/2022 10:26 AM CDT  Subject: Thyroid Bloodwork    When do I need to do thyroid blood work again? ? October will be 3 months, didnt know how often I needed to have checked since it was removed in August of 2021.  Thanks

## 2022-09-19 RX ORDER — FEXOFENADINE HYDROCHLORIDE AND PSEUDOEPHEDRINE HYDROCHLORIDE 180; 240 MG/1; MG/1
TABLET, FILM COATED, EXTENDED RELEASE ORAL
Qty: 90 TABLET | Refills: 1 | Status: SHIPPED | OUTPATIENT
Start: 2022-09-19

## 2022-09-19 NOTE — TELEPHONE ENCOUNTER
Felicia Flores called requesting a refill of the below medication which has been pended for you:     Requested Prescriptions     Pending Prescriptions Disp Refills    ALLEGRA-D ALLERGY & CONGESTION 180-240 MG per extended release tablet [Pharmacy Med Name: ALLEGRA-D 24 HOUR TABLET] 90 tablet 1     Sig: TAKE 1 TABLET BY MOUTH EVERY DAY       Last Appointment Date: 7/14/2022  Next Appointment Date: 3/6/2023    No Known Allergies

## 2022-10-24 DIAGNOSIS — E03.9 HYPOTHYROIDISM (ACQUIRED): ICD-10-CM

## 2022-10-24 LAB
T4 FREE: 1.43 NG/DL (ref 0.93–1.7)
TSH SERPL DL<=0.05 MIU/L-ACNC: 3.11 UIU/ML (ref 0.27–4.2)

## 2022-11-08 ENCOUNTER — OFFICE VISIT (OUTPATIENT)
Dept: INTERNAL MEDICINE | Age: 52
End: 2022-11-08
Payer: COMMERCIAL

## 2022-11-08 VITALS
OXYGEN SATURATION: 98 % | SYSTOLIC BLOOD PRESSURE: 120 MMHG | HEIGHT: 66 IN | WEIGHT: 164 LBS | HEART RATE: 76 BPM | BODY MASS INDEX: 26.36 KG/M2 | DIASTOLIC BLOOD PRESSURE: 64 MMHG

## 2022-11-08 DIAGNOSIS — J34.89 SINUS PRESSURE: ICD-10-CM

## 2022-11-08 DIAGNOSIS — R09.82 POSTNASAL DRIP: ICD-10-CM

## 2022-11-08 DIAGNOSIS — R05.1 ACUTE COUGH: ICD-10-CM

## 2022-11-08 DIAGNOSIS — J20.9 ACUTE BRONCHITIS AND BRONCHIOLITIS: Primary | ICD-10-CM

## 2022-11-08 DIAGNOSIS — J01.00 ACUTE NON-RECURRENT MAXILLARY SINUSITIS: ICD-10-CM

## 2022-11-08 DIAGNOSIS — J21.9 ACUTE BRONCHITIS AND BRONCHIOLITIS: Primary | ICD-10-CM

## 2022-11-08 PROCEDURE — 99213 OFFICE O/P EST LOW 20 MIN: CPT | Performed by: INTERNAL MEDICINE

## 2022-11-08 RX ORDER — PREDNISONE 10 MG/1
10 TABLET ORAL DAILY
Qty: 5 TABLET | Refills: 0 | Status: SHIPPED | OUTPATIENT
Start: 2022-11-08 | End: 2022-11-13

## 2022-11-08 RX ORDER — DOXYCYCLINE HYCLATE 100 MG
100 TABLET ORAL 2 TIMES DAILY
Qty: 14 TABLET | Refills: 0 | Status: SHIPPED | OUTPATIENT
Start: 2022-11-08 | End: 2022-11-15

## 2022-11-08 RX ORDER — QUETIAPINE FUMARATE 25 MG/1
25 TABLET, FILM COATED ORAL NIGHTLY PRN
Qty: 30 TABLET | Refills: 2 | Status: SHIPPED | OUTPATIENT
Start: 2022-11-08

## 2022-11-08 ASSESSMENT — ENCOUNTER SYMPTOMS
COUGH: 1
RHINORRHEA: 1
WHEEZING: 0
CONSTIPATION: 0
CHEST TIGHTNESS: 0
SORE THROAT: 1
ABDOMINAL PAIN: 0

## 2022-11-08 NOTE — PROGRESS NOTES
BY MOUTH INTO THE LUNGS DAILY (Patient not taking: Reported on 11/8/2022) 60 each 2    Naltrexone HCl POWD TAKE 1 CAPSULE AT BEDTIME 90 g 3     No current facility-administered medications for this visit. No Known Allergies  Social History     Tobacco Use    Smoking status: Never    Smokeless tobacco: Never   Substance Use Topics    Alcohol use: Never      Family History   Problem Relation Age of Onset    Thyroid Disease Mother     High Blood Pressure Father     Anxiety Disorder Father     Arthritis Father     Cancer Maternal Grandmother     Diabetes Paternal Grandmother     Cancer Paternal Grandmother        Review of Systems   Constitutional:  Positive for fatigue. Negative for chills and fever. HENT:  Positive for congestion, postnasal drip, rhinorrhea and sore throat. Negative for ear pain and nosebleeds. Respiratory:  Positive for cough. Negative for chest tightness and wheezing. Cardiovascular:  Negative for chest pain, palpitations and leg swelling. Gastrointestinal:  Negative for abdominal pain and constipation. Genitourinary:  Negative for dysuria and urgency. Musculoskeletal: Negative. Negative for arthralgias. Skin:  Negative for rash. Neurological:  Negative for dizziness and headaches. Psychiatric/Behavioral: Negative. Vitals:    11/08/22 1200   BP: 120/64   Pulse: 76   SpO2: 98%   Weight: 164 lb (74.4 kg)   Height: 5' 6\" (1.676 m)     Body mass index is 26.47 kg/m². Physical Exam  Constitutional:       Appearance: She is well-developed. HENT:      Right Ear: External ear normal.      Left Ear: External ear normal.      Nose: Congestion and rhinorrhea present. Mouth/Throat:      Pharynx: No oropharyngeal exudate. Eyes:      Conjunctiva/sclera: Conjunctivae normal.      Pupils: Pupils are equal, round, and reactive to light. Neck:      Thyroid: No thyromegaly. Vascular: No JVD. Cardiovascular:      Rate and Rhythm: Normal rate.       Heart sounds: Normal heart sounds. No murmur heard. Pulmonary:      Effort: No respiratory distress. Breath sounds: Wheezing, rhonchi and rales present. Chest:      Chest wall: No tenderness. Abdominal:      General: Bowel sounds are normal.      Palpations: Abdomen is soft. Musculoskeletal:         General: Normal range of motion. Cervical back: Neck supple. Lymphadenopathy:      Cervical: No cervical adenopathy. Skin:     General: Skin is warm. Findings: No rash. Neurological:      Mental Status: She is oriented to person, place, and time. Lab Review   Orders Only on 10/24/2022   Component Date Value    T4 Free 10/24/2022 1.43     TSH 10/24/2022 3.110            ASSESSMENT/PLAN:    Acute bronchitis and bronchiolitis    Acute non-recurrent maxillary sinusitis    Acute cough    Sinus pressure    Postnasal drip    Ongoing symptoms x2 weeks-no viral symptoms like chills fever etc.  Patient also has severe allergies she is on Zyrtec, Singulair, Flonase already  Suggest Rx doxycycline twice daily  Rx steroid prednisone low-dose 10 mg daily x5 days  Use your Flonase daily  Also restart using Breo inhaler      Issues with insomnia  Has been taking trazodone for years no longer it is working for her  Also tried Seroquel low-dose  Seroquel 25 mg at bedtime sent to the pharmacy    No orders of the defined types were placed in this encounter. New Prescriptions    DOXYCYCLINE HYCLATE (VIBRA-TABS) 100 MG TABLET    Take 1 tablet by mouth 2 times daily for 7 days    PREDNISONE (DELTASONE) 10 MG TABLET    Take 1 tablet by mouth daily for 5 days    QUETIAPINE (SEROQUEL) 25 MG TABLET    Take 1 tablet by mouth nightly as needed (sleep)         No follow-ups on file. There are no Patient Instructions on file for this visit. EMR Dragon/transcription disclaimer:Significant part of this  encounter note is electronic transcription/translationof spoken language to printed text.  The electronic translation of spoken language may be erroneous, or at times, nonsensical words or phrases may be inadvertently transcribed.  Although I have reviewed the note for sucherrors, some may still exist.

## 2022-11-17 RX ORDER — TRAZODONE HYDROCHLORIDE 50 MG/1
TABLET ORAL
Qty: 90 TABLET | Refills: 0 | OUTPATIENT
Start: 2022-11-17

## 2022-12-05 RX ORDER — QUETIAPINE FUMARATE 25 MG/1
25 TABLET, FILM COATED ORAL NIGHTLY PRN
Qty: 90 TABLET | Refills: 0 | Status: SHIPPED | OUTPATIENT
Start: 2022-12-05

## 2023-01-25 ENCOUNTER — PATIENT MESSAGE (OUTPATIENT)
Dept: INTERNAL MEDICINE | Age: 53
End: 2023-01-25

## 2023-01-25 RX ORDER — LEVOTHYROXINE SODIUM 0.1 MG/1
TABLET ORAL
Qty: 30 TABLET | Refills: 5 | Status: SHIPPED | OUTPATIENT
Start: 2023-01-25

## 2023-01-25 NOTE — TELEPHONE ENCOUNTER
From: Alina Barclay  To: Dr. Lita Saenz  Sent: 1/25/2023 11:33 AM CST  Subject: Levothyroxzine    Not sure that is spelled correctly….[facility] should have contacted you about 100mg prescription refill. I am out of the medicine took my last pill this morning. Can this be refilled?

## 2023-01-25 NOTE — TELEPHONE ENCOUNTER
Nelly Brink called requesting a refill of the below medication which has been pended for you:     Requested Prescriptions     Pending Prescriptions Disp Refills    levothyroxine (SYNTHROID) 100 MCG tablet 30 tablet 5     Sig: TAKE 1 TABLET BY MOUTH EVERY DAY IN THE MORNING       Last Appointment Date: 11/8/2022  Next Appointment Date: 3/6/2023    No Known Allergies

## 2023-01-26 ENCOUNTER — PATIENT MESSAGE (OUTPATIENT)
Dept: INTERNAL MEDICINE | Age: 53
End: 2023-01-26

## 2023-01-26 DIAGNOSIS — J45.909 MODERATE ASTHMA WITHOUT COMPLICATION, UNSPECIFIED WHETHER PERSISTENT: Primary | ICD-10-CM

## 2023-01-27 DIAGNOSIS — J45.909 MODERATE ASTHMA WITHOUT COMPLICATION, UNSPECIFIED WHETHER PERSISTENT: ICD-10-CM

## 2023-01-27 RX ORDER — FLUTICASONE FUROATE AND VILANTEROL 200; 25 UG/1; UG/1
POWDER RESPIRATORY (INHALATION)
Qty: 180 EACH | Refills: 2 | Status: SHIPPED | OUTPATIENT
Start: 2023-01-27 | End: 2023-01-27 | Stop reason: SDUPTHER

## 2023-01-27 NOTE — TELEPHONE ENCOUNTER
From: Keely Ladd  To: Dr. Wise Dadds: 1/26/2023 5:21 PM CST  Subject: Script    My Breo Inhaler needs to be called in for a 90 day supply in order for me to get a cheaper price.  Thanks

## 2023-03-01 DIAGNOSIS — F51.01 PRIMARY INSOMNIA: ICD-10-CM

## 2023-03-01 DIAGNOSIS — Z12.31 ENCOUNTER FOR SCREENING MAMMOGRAM FOR BREAST CANCER: ICD-10-CM

## 2023-03-01 DIAGNOSIS — Z00.00 ANNUAL PHYSICAL EXAM: ICD-10-CM

## 2023-03-01 DIAGNOSIS — E89.0 POSTSURGICAL HYPOTHYROIDISM: ICD-10-CM

## 2023-03-01 DIAGNOSIS — R73.01 IFG (IMPAIRED FASTING GLUCOSE): ICD-10-CM

## 2023-03-01 DIAGNOSIS — E55.9 VITAMIN D DEFICIENCY: ICD-10-CM

## 2023-03-01 DIAGNOSIS — R30.0 DYSURIA: ICD-10-CM

## 2023-03-01 DIAGNOSIS — E03.9 HYPOTHYROIDISM (ACQUIRED): ICD-10-CM

## 2023-03-01 LAB
ALBUMIN SERPL-MCNC: 4.4 G/DL (ref 3.5–5.2)
ALP BLD-CCNC: 76 U/L (ref 35–104)
ALT SERPL-CCNC: 20 U/L (ref 5–33)
ANION GAP SERPL CALCULATED.3IONS-SCNC: 8 MMOL/L (ref 7–19)
AST SERPL-CCNC: 22 U/L (ref 5–32)
BACTERIA: NEGATIVE /HPF
BASOPHILS ABSOLUTE: 0 K/UL (ref 0–0.2)
BASOPHILS RELATIVE PERCENT: 0 % (ref 0–1)
BILIRUB SERPL-MCNC: 0.6 MG/DL (ref 0.2–1.2)
BILIRUBIN URINE: NEGATIVE
BLOOD, URINE: NEGATIVE
BUN BLDV-MCNC: 19 MG/DL (ref 6–20)
CALCIUM SERPL-MCNC: 9.3 MG/DL (ref 8.6–10)
CHLORIDE BLD-SCNC: 106 MMOL/L (ref 98–111)
CHOLESTEROL, TOTAL: 204 MG/DL (ref 160–199)
CLARITY: ABNORMAL
CO2: 29 MMOL/L (ref 22–29)
COLOR: YELLOW
CREAT SERPL-MCNC: 0.9 MG/DL (ref 0.5–0.9)
CRYSTALS, UA: ABNORMAL /HPF
EOSINOPHILS ABSOLUTE: 0 K/UL (ref 0–0.6)
EOSINOPHILS RELATIVE PERCENT: 0 % (ref 0–5)
EPITHELIAL CELLS, UA: 4 /HPF (ref 0–5)
GFR SERPL CREATININE-BSD FRML MDRD: >60 ML/MIN/{1.73_M2}
GLUCOSE BLD-MCNC: 92 MG/DL (ref 74–109)
GLUCOSE URINE: NEGATIVE MG/DL
HCT VFR BLD CALC: 41.3 % (ref 37–47)
HDLC SERPL-MCNC: 61 MG/DL (ref 65–121)
HEMOGLOBIN: 13.9 G/DL (ref 12–16)
HYALINE CASTS: 5 /HPF (ref 0–8)
IMMATURE GRANULOCYTES #: 0 K/UL
KETONES, URINE: NEGATIVE MG/DL
LDL CHOLESTEROL CALCULATED: 122 MG/DL
LEUKOCYTE ESTERASE, URINE: ABNORMAL
LYMPHOCYTES ABSOLUTE: 1.4 K/UL (ref 1.1–4.5)
LYMPHOCYTES RELATIVE PERCENT: 38.4 % (ref 20–40)
MCH RBC QN AUTO: 32.3 PG (ref 27–31)
MCHC RBC AUTO-ENTMCNC: 33.7 G/DL (ref 33–37)
MCV RBC AUTO: 96 FL (ref 81–99)
MONOCYTES ABSOLUTE: 0.3 K/UL (ref 0–0.9)
MONOCYTES RELATIVE PERCENT: 9.4 % (ref 0–10)
NEUTROPHILS ABSOLUTE: 1.9 K/UL (ref 1.5–7.5)
NEUTROPHILS RELATIVE PERCENT: 52.2 % (ref 50–65)
NITRITE, URINE: NEGATIVE
PDW BLD-RTO: 12.2 % (ref 11.5–14.5)
PH UA: 5.5 (ref 5–8)
PLATELET # BLD: 247 K/UL (ref 130–400)
PMV BLD AUTO: 9.5 FL (ref 9.4–12.3)
POTASSIUM SERPL-SCNC: 4.4 MMOL/L (ref 3.5–5)
PROTEIN UA: NEGATIVE MG/DL
RBC # BLD: 4.3 M/UL (ref 4.2–5.4)
RBC UA: 2 /HPF (ref 0–4)
SODIUM BLD-SCNC: 143 MMOL/L (ref 136–145)
SPECIFIC GRAVITY UA: 1.02 (ref 1–1.03)
T4 FREE: 1.47 NG/DL (ref 0.93–1.7)
TOTAL PROTEIN: 6.5 G/DL (ref 6.6–8.7)
TRIGL SERPL-MCNC: 107 MG/DL (ref 0–149)
TSH SERPL DL<=0.05 MIU/L-ACNC: 2.02 UIU/ML (ref 0.27–4.2)
UROBILINOGEN, URINE: 0.2 E.U./DL
WBC # BLD: 3.6 K/UL (ref 4.8–10.8)
WBC UA: 3 /HPF (ref 0–5)

## 2023-03-03 LAB — URINE CULTURE, ROUTINE: NORMAL

## 2023-03-06 ENCOUNTER — OFFICE VISIT (OUTPATIENT)
Dept: INTERNAL MEDICINE | Age: 53
End: 2023-03-06
Payer: COMMERCIAL

## 2023-03-06 ENCOUNTER — TRANSCRIBE ORDERS (OUTPATIENT)
Dept: ADMINISTRATIVE | Facility: HOSPITAL | Age: 53
End: 2023-03-06
Payer: COMMERCIAL

## 2023-03-06 VITALS
OXYGEN SATURATION: 98 % | BODY MASS INDEX: 26.52 KG/M2 | DIASTOLIC BLOOD PRESSURE: 80 MMHG | WEIGHT: 165 LBS | RESPIRATION RATE: 18 BRPM | HEIGHT: 66 IN | SYSTOLIC BLOOD PRESSURE: 118 MMHG | HEART RATE: 76 BPM

## 2023-03-06 DIAGNOSIS — Z12.31 VISIT FOR SCREENING MAMMOGRAM: Primary | ICD-10-CM

## 2023-03-06 DIAGNOSIS — E89.0 POSTSURGICAL HYPOTHYROIDISM: ICD-10-CM

## 2023-03-06 DIAGNOSIS — R73.01 IFG (IMPAIRED FASTING GLUCOSE): ICD-10-CM

## 2023-03-06 DIAGNOSIS — Z00.00 ANNUAL PHYSICAL EXAM: Primary | ICD-10-CM

## 2023-03-06 DIAGNOSIS — Z12.31 ENCOUNTER FOR SCREENING MAMMOGRAM FOR BREAST CANCER: ICD-10-CM

## 2023-03-06 DIAGNOSIS — F51.01 PRIMARY INSOMNIA: ICD-10-CM

## 2023-03-06 DIAGNOSIS — E55.9 VITAMIN D DEFICIENCY: ICD-10-CM

## 2023-03-06 PROCEDURE — 99396 PREV VISIT EST AGE 40-64: CPT | Performed by: INTERNAL MEDICINE

## 2023-03-06 ASSESSMENT — PATIENT HEALTH QUESTIONNAIRE - PHQ9
SUM OF ALL RESPONSES TO PHQ QUESTIONS 1-9: 0
2. FEELING DOWN, DEPRESSED OR HOPELESS: 0
1. LITTLE INTEREST OR PLEASURE IN DOING THINGS: 0
SUM OF ALL RESPONSES TO PHQ9 QUESTIONS 1 & 2: 0
SUM OF ALL RESPONSES TO PHQ QUESTIONS 1-9: 0

## 2023-03-06 ASSESSMENT — ENCOUNTER SYMPTOMS
SORE THROAT: 0
CONSTIPATION: 0
ABDOMINAL PAIN: 0
WHEEZING: 0
COUGH: 0
CHEST TIGHTNESS: 0

## 2023-03-28 RX ORDER — QUETIAPINE FUMARATE 25 MG/1
25 TABLET, FILM COATED ORAL NIGHTLY PRN
Qty: 90 TABLET | Refills: 0 | Status: SHIPPED | OUTPATIENT
Start: 2023-03-28

## 2023-03-28 NOTE — TELEPHONE ENCOUNTER
Federico Lowe called requesting a refill of the below medication which has been pended for you:     Requested Prescriptions     Pending Prescriptions Disp Refills    QUEtiapine (SEROQUEL) 25 MG tablet [Pharmacy Med Name: QUETIAPINE FUMARATE 25 MG TAB] 90 tablet 0     Sig: TAKE 1 TABLET BY MOUTH NIGHTLY AS NEEDED (SLEEP).        Last Appointment Date: 3/6/2023  Next Appointment Date: Visit date not found    No Known Allergies

## 2023-04-10 ENCOUNTER — OFFICE VISIT (OUTPATIENT)
Dept: GASTROENTEROLOGY | Facility: CLINIC | Age: 53
End: 2023-04-10
Payer: COMMERCIAL

## 2023-04-10 VITALS
HEIGHT: 66 IN | SYSTOLIC BLOOD PRESSURE: 152 MMHG | TEMPERATURE: 95.9 F | DIASTOLIC BLOOD PRESSURE: 90 MMHG | OXYGEN SATURATION: 99 % | BODY MASS INDEX: 26.84 KG/M2 | WEIGHT: 167 LBS | HEART RATE: 63 BPM

## 2023-04-10 DIAGNOSIS — Z80.0 FAMILY HX OF COLON CANCER: ICD-10-CM

## 2023-04-10 DIAGNOSIS — Z83.71 FAMILY HX COLONIC POLYPS: ICD-10-CM

## 2023-04-10 DIAGNOSIS — Z86.010 HX OF COLONIC POLYP: Primary | ICD-10-CM

## 2023-04-10 PROCEDURE — S0285 CNSLT BEFORE SCREEN COLONOSC: HCPCS | Performed by: NURSE PRACTITIONER

## 2023-04-10 RX ORDER — QUETIAPINE FUMARATE 100 MG/1
10 TABLET, FILM COATED ORAL NIGHTLY
COMMUNITY

## 2023-04-10 NOTE — PROGRESS NOTES
[facility]    Primary Physician Keyanna Pace MD    4/10/2023    Abi Tamez   1970      Chief Complaint   Patient presents with   • Colonoscopy       Subjective      HPI    Abi Tamez is a 52 y.o. female who presents as a referral for preventative maintenance. She has no complaints of nausea or vomiting. No change in bowels. No wt loss. No BRBPR. No melena. No abdominal pain.       SCANNED - COLONOSCOPY (02/24/2012 00:00)      There is a family history of colon polyps: mother and father. There is a  family history of colon cancer: maternal gm     Past Medical History:   Diagnosis Date   • Colitis cystica profunda     2012.   • Colon polyp    • Depression    • History of colon polyps    • Seasonal allergies        Past Surgical History:   Procedure Laterality Date   • COLONOSCOPY  02/24/2012    colitis cystica profunda   • HYSTERECTOMY     • OOPHORECTOMY     • THYROIDECTOMY         Outpatient Medications Marked as Taking for the 4/10/23 encounter (Office Visit) with Megan Mancia APRN   Medication Sig Dispense Refill   • Cholecalciferol 26812 units capsule Take  by mouth.     • fluticasone (FLONASE) 50 MCG/ACT nasal spray 2 sprays into the nostril(s) as directed by provider Daily.     • levothyroxine (SYNTHROID, LEVOTHROID) 50 MCG tablet Take 1 tablet by mouth Daily.  3   • montelukast (SINGULAIR) 10 MG tablet TAKE 1 TABLET BY MOUTH EVERY DAY 90 tablet 3   • NALTREXONE HCL PO Take 4.5 mg by mouth Daily. From [facility] - at pharmacy recommendations.     • Polyethylene Glycol 3350-GRX powder Nightly as needed     • QUEtiapine (SEROquel) 100 MG tablet Take 10 mg by mouth Every Night.         No Known Allergies    Social History     Socioeconomic History   • Marital status:    Tobacco Use   • Smoking status: Never   • Smokeless tobacco: Never   Substance and Sexual Activity   • Alcohol use: No   • Drug use: No   • Sexual activity: Yes     Partners: Male        Family History   Problem Relation Age of Onset   • Colon polyps Mother    • Colon polyps Father    • Colon cancer Maternal Grandmother    • Breast cancer Paternal Grandmother        Review of Systems   Constitutional: Negative for chills, fever and unexpected weight change.   Respiratory: Negative for shortness of breath.    Cardiovascular: Negative for chest pain.   Gastrointestinal: Negative for abdominal distention, abdominal pain, anal bleeding, blood in stool, constipation, diarrhea, nausea and vomiting.       Objective     Vitals:    04/10/23 0937   BP: 152/90   Pulse: 63   Temp: 95.9 °F (35.5 °C)   SpO2: 99%         04/10/23  0937   Weight: 75.8 kg (167 lb)     Body mass index is 26.95 kg/m².    Physical Exam  Vitals reviewed.   Constitutional:       General: She is not in acute distress.  Cardiovascular:      Rate and Rhythm: Normal rate and regular rhythm.      Heart sounds: Normal heart sounds.   Pulmonary:      Effort: Pulmonary effort is normal.      Breath sounds: Normal breath sounds.   Abdominal:      General: Bowel sounds are normal. There is no distension.      Palpations: Abdomen is soft.      Tenderness: There is no abdominal tenderness.   Skin:     General: Skin is warm and dry.   Neurological:      Mental Status: She is alert.         Imaging Results (Most Recent)     None          Assessment & Plan     Diagnoses and all orders for this visit:    1. Hx of colonic polyp (Primary)  -     Case Request; Standing  -     Case Request    2. Family hx of colon cancer  -     Case Request; Standing  -     Case Request    3. Family hx colonic polyps  -     Case Request; Standing  -     Case Request    Other orders  -     Implement Anesthesia Orders Day of Procedure; Standing  -     Obtain Informed Consent; Standing        Schedule colonoscopy. Use miralax prep.                COLONOSCOPY WITH ANESTHESIA (N/A)  All risks, benefits, alternatives, and indications of colonoscopy procedure have been  discussed with the patient. Risks to include perforation of the colon requiring possible surgery or colostomy, risk of bleeding from biopsies or removal of colon tissue, possibility of missing a colon polyp or cancer, or adverse drug reaction.  Benefits to include the diagnosis and management of disease of the colon and rectum. Alternatives to include barium enema, radiographic evaluation, lab testing or no intervention. Pt verbalizes understanding and agrees.     There are no Patient Instructions on file for this visit.    GIOVANNI Bazan

## 2023-04-11 PROBLEM — Z86.0100 HX OF COLONIC POLYP: Status: ACTIVE | Noted: 2023-04-11

## 2023-04-11 PROBLEM — Z83.719 FAMILY HX COLONIC POLYPS: Status: ACTIVE | Noted: 2023-04-11

## 2023-04-11 PROBLEM — Z86.010 HX OF COLONIC POLYP: Status: ACTIVE | Noted: 2023-04-11

## 2023-04-11 PROBLEM — Z83.71 FAMILY HX COLONIC POLYPS: Status: ACTIVE | Noted: 2023-04-11

## 2023-04-11 PROBLEM — Z80.0 FAMILY HX OF COLON CANCER: Status: ACTIVE | Noted: 2023-04-11

## 2023-04-14 ENCOUNTER — TELEPHONE (OUTPATIENT)
Dept: GASTROENTEROLOGY | Facility: CLINIC | Age: 53
End: 2023-04-14

## 2023-04-14 NOTE — TELEPHONE ENCOUNTER
Caller: Abi Tamez    Relationship to patient: Self    Best call back number: [phone]    Patient is needing: PT CALLING ASKING IF SHE CAN GET A PRESCRIPTION FOR SUPREP IN PLACE OF CLENPIQ FOR COLONOSCOPY PREP. ANYTIME IS OKAY TO CALL BACK AND VM IS OKAY IF NO ANSWER. PLEASE CALL BACK AND ADVISE.

## 2023-04-19 RX ORDER — QUETIAPINE FUMARATE 25 MG/1
25 TABLET, FILM COATED ORAL NIGHTLY PRN
Qty: 90 TABLET | Refills: 0 | Status: SHIPPED | OUTPATIENT
Start: 2023-04-19

## 2023-04-19 RX ORDER — MONTELUKAST SODIUM 10 MG/1
TABLET ORAL
Qty: 90 TABLET | Refills: 3 | Status: SHIPPED | OUTPATIENT
Start: 2023-04-19

## 2023-05-16 ENCOUNTER — TELEPHONE (OUTPATIENT)
Dept: GASTROENTEROLOGY | Facility: CLINIC | Age: 53
End: 2023-05-16
Payer: COMMERCIAL

## 2023-05-16 NOTE — TELEPHONE ENCOUNTER
PT rc'd a letter that she was going to owe $1300 out of pocket for her colonoscopy. She said this should be preventative.  Her last one was 2-24-2-12.

## 2023-05-22 NOTE — TELEPHONE ENCOUNTER
Please let her know that we use a modifier to show that this is surveillance.  She needs to call her insurance and ask them if they cover it at 100% with a personal history of polyps. We have it documented that she is not having any problems.

## 2023-06-20 DIAGNOSIS — Z12.31 ENCOUNTER FOR SCREENING MAMMOGRAM FOR BREAST CANCER: ICD-10-CM

## 2023-07-12 RX ORDER — FEXOFENADINE HYDROCHLORIDE AND PSEUDOEPHEDRINE HYDROCHLORIDE 180; 240 MG/1; MG/1
TABLET, FILM COATED, EXTENDED RELEASE ORAL
Qty: 90 TABLET | Refills: 1 | Status: SHIPPED | OUTPATIENT
Start: 2023-07-12

## 2023-07-12 NOTE — TELEPHONE ENCOUNTER
Last Appointment Date: 3/6/2023  Next Appointment Date: 3/06/2024    No Known Allergies    Patient needs refill on   Requested Prescriptions     Pending Prescriptions Disp Refills    ALLEGRA-D ALLERGY & CONGESTION 180-240 MG per extended release tablet [Pharmacy Med Name: ALLEGRA-D 24 HOUR TABLET] 90 tablet 1     Sig: TAKE 1 TABLET BY MOUTH EVERY DAY

## 2023-07-20 RX ORDER — LEVOTHYROXINE SODIUM 0.1 MG/1
TABLET ORAL
Qty: 90 TABLET | Refills: 1 | Status: SHIPPED | OUTPATIENT
Start: 2023-07-20

## 2023-07-20 NOTE — TELEPHONE ENCOUNTER
Rhianna Alonso called requesting a refill of the below medication which has been pended for you:     Requested Prescriptions     Pending Prescriptions Disp Refills    levothyroxine (SYNTHROID) 100 MCG tablet [Pharmacy Med Name: LEVOTHYROXINE 100 MCG TABLET] 90 tablet 1     Sig: TAKE 1 TABLET BY MOUTH EVERY DAY IN THE MORNING       Last Appointment Date: 3/6/2023  Next Appointment Date: Visit date not found    No Known Allergies

## 2023-08-24 ENCOUNTER — TELEPHONE (OUTPATIENT)
Dept: INTERNAL MEDICINE | Age: 53
End: 2023-08-24

## 2023-08-24 RX ORDER — CIPROFLOXACIN 250 MG/1
250 TABLET, FILM COATED ORAL 2 TIMES DAILY
Qty: 10 TABLET | Refills: 0 | Status: SHIPPED | OUTPATIENT
Start: 2023-08-24 | End: 2023-08-29

## 2023-08-24 NOTE — TELEPHONE ENCOUNTER
Patient suspects she has a UTI and states she lives over an hour away. Patient would like to know what she needs to do to get medication called in.

## 2023-09-11 RX ORDER — QUETIAPINE FUMARATE 25 MG/1
25 TABLET, FILM COATED ORAL NIGHTLY PRN
Qty: 90 TABLET | Refills: 1 | Status: SHIPPED | OUTPATIENT
Start: 2023-09-11

## 2023-09-11 NOTE — TELEPHONE ENCOUNTER
Lisa Mynor called to request a refill on her medication.       Last office visit : 3/6/2023   Next office visit : 3/6/2024     Requested Prescriptions     Pending Prescriptions Disp Refills    QUEtiapine (SEROQUEL) 25 MG tablet 90 tablet 0     Sig: Take 1 tablet by mouth nightly as needed (sleep)            Carrie Clifton MA

## 2023-09-14 RX ORDER — QUETIAPINE FUMARATE 25 MG/1
25 TABLET, FILM COATED ORAL NIGHTLY PRN
Qty: 90 TABLET | Refills: 1 | OUTPATIENT
Start: 2023-09-14

## 2023-10-16 RX ORDER — QUETIAPINE FUMARATE 25 MG/1
25 TABLET, FILM COATED ORAL NIGHTLY PRN
Qty: 90 TABLET | Refills: 1 | Status: SHIPPED | OUTPATIENT
Start: 2023-10-16

## 2023-10-16 NOTE — TELEPHONE ENCOUNTER
Claudette Brink called to request a refill on her medication. Last office visit : 3/6/2023   Next office visit : 3/6/2024     Requested Prescriptions     Pending Prescriptions Disp Refills    QUEtiapine (SEROQUEL) 25 MG tablet [Pharmacy Med Name: QUETIAPINE FUMARATE 25 MG TAB] 90 tablet 1     Sig: TAKE 1 TABLET BY MOUTH NIGHTLY AS NEEDED (SLEEP).             Maile Manning MA

## 2023-11-12 NOTE — TELEPHONE ENCOUNTER
Gastroenterology Progress Note      SUBJECTIVE  No GI complaints.  Had plan for endoscopic ultrasound but was deferred given the Coumadin.  His liver enzymes continue to improve.    Last Recorded Vitals  Blood pressure 109/74, pulse 83, temperature 98.2 °F (36.8 °C), temperature source Oral, resp. rate 18, height 5' 11\" (1.803 m), weight 118 kg (260 lb 2.3 oz), SpO2 94 %.    Physical Exam  Abd exam: Nondistended, normal active bowel sounds, nontender throughout without rebound or guarding.        Recen  Recent Results (from the past 24 hour(s))   Comprehensive Metabolic Panel    Collection Time: 11/10/23  4:48 AM   Result Value Ref Range    Fasting Status      Sodium 141 135 - 145 mmol/L    Potassium 4.2 3.4 - 5.1 mmol/L    Chloride 113 (H) 97 - 110 mmol/L    Carbon Dioxide 24 21 - 32 mmol/L    Anion Gap 8 7 - 19 mmol/L    Glucose 104 (H) 70 - 99 mg/dL    BUN 16 6 - 20 mg/dL    Creatinine 0.94 0.67 - 1.17 mg/dL    Glomerular Filtration Rate 86 >=60    BUN/Cr 17 7 - 25    Calcium 7.3 (L) 8.4 - 10.2 mg/dL    Bilirubin, Total 3.3 (H) 0.2 - 1.0 mg/dL    GOT/AST 94 (H) <=37 Units/L    GPT/ALT 93 (H) <64 Units/L    Alkaline Phosphatase 118 (H) 45 - 117 Units/L    Albumin 1.6 (L) 3.6 - 5.1 g/dL    Protein, Total 5.4 (L) 6.4 - 8.2 g/dL    Globulin 3.8 2.0 - 4.0 g/dL    A/G Ratio 0.4 (L) 1.0 - 2.4   Prothrombin Time (INR/PT)    Collection Time: 11/10/23  4:48 AM   Result Value Ref Range    Protime- PT 31.3 (H) 9.7 - 11.8 sec    INR 3.2     CBC No Differential    Collection Time: 11/10/23  4:48 AM   Result Value Ref Range    WBC 15.1 (H) 4.2 - 11.0 K/mcL    RBC 2.96 (L) 4.50 - 5.90 mil/mcL    HGB 9.7 (L) 13.0 - 17.0 g/dL    HCT 29.7 (L) 39.0 - 51.0 %    .3 (H) 78.0 - 100.0 fl    MCH 32.8 26.0 - 34.0 pg    MCHC 32.7 32.0 - 36.5 g/dL     140 - 450 K/mcL    RDW-CV 15.0 11.0 - 15.0 %    RDW-SD 54.5 (H) 39.0 - 50.0 fL    NRBC 0 <=0 /100 WBC   t Results (from the past 24 hour(s))   Potassium    Collection Time:  Pt left VM asking for refill on Naltrexone 4.5mg QD to [facility] in [place]. Pt states she needs this ASAP. She is going out of town Tues.    11/09/23 12:01 AM   Result Value Ref Range    Potassium 3.6 3.4 - 5.1 mmol/L   Creatine Kinase    Collection Time: 11/09/23  4:13 AM   Result Value Ref Range     (H) 39 - 308 Units/L   Comprehensive Metabolic Panel    Collection Time: 11/09/23  4:13 AM   Result Value Ref Range    Fasting Status      Sodium 139 135 - 145 mmol/L    Potassium 4.0 3.4 - 5.1 mmol/L    Chloride 111 (H) 97 - 110 mmol/L    Carbon Dioxide 25 21 - 32 mmol/L    Anion Gap 7 7 - 19 mmol/L    Glucose 108 (H) 70 - 99 mg/dL    BUN 21 (H) 6 - 20 mg/dL    Creatinine 0.96 0.67 - 1.17 mg/dL    Glomerular Filtration Rate 84 >=60    BUN/Cr 22 7 - 25    Calcium 7.5 (L) 8.4 - 10.2 mg/dL    Bilirubin, Total 3.5 (H) 0.2 - 1.0 mg/dL    GOT/ (H) <=37 Units/L    GPT/ALT 97 (H) <64 Units/L    Alkaline Phosphatase 119 (H) 45 - 117 Units/L    Albumin 1.6 (L) 3.6 - 5.1 g/dL    Protein, Total 5.4 (L) 6.4 - 8.2 g/dL    Globulin 3.8 2.0 - 4.0 g/dL    A/G Ratio 0.4 (L) 1.0 - 2.4   Prothrombin Time (INR/PT)    Collection Time: 11/09/23  4:13 AM   Result Value Ref Range    Protime- PT 25.4 (H) 9.7 - 11.8 sec    INR 2.5     CBC No Differential    Collection Time: 11/09/23  4:13 AM   Result Value Ref Range    WBC 20.1 (H) 4.2 - 11.0 K/mcL    RBC 3.16 (L) 4.50 - 5.90 mil/mcL    HGB 10.3 (L) 13.0 - 17.0 g/dL    HCT 31.3 (L) 39.0 - 51.0 %    MCV 99.1 78.0 - 100.0 fl    MCH 32.6 26.0 - 34.0 pg    MCHC 32.9 32.0 - 36.5 g/dL     140 - 450 K/mcL    RDW-CV 14.8 11.0 - 15.0 %    RDW-SD 54.1 (H) 39.0 - 50.0 fL    NRBC 0 <=0 /100 WBC      Final Result         1. Diffuse subcutaneous edema and skin thickening more localized within the   anterior lower leg. While a discrete fluid collection is not seen it is   difficult to completely exclude given lack of IV contrast. Consider   targeted ultrasound to exclude a focal area of fluid.            Electronically Signed by: LAYTON PAREDES M.D.    Signed on: 11/7/2023 5:49 PM    Workstation ID: [id number]      CT  ABDOMEN PELVIS W CONTRAST   Final Result      1. Mild fatty infiltration of the liver. No CT findings for acute   cholecystitis.   2. Mild to moderate bilateral perinephric stranding is nonspecific although   can be seen with renal insufficiency. Correlate with appropriate laboratory   values and to exclude a superimposed renal infection.   3. Trace bilateral pleural effusions with adjacent atelectasis.   4. Small nonspecific left adrenal nodule. Recommend short-term CT or MRI   follow-up.   5. Aneurysmal dilatation of the infrarenal abdominal aorta up to 4.2 cm.   6. A few prominent left inguinal lymph nodes may be reactive although   nonspecific. Recommend short-term ultrasound follow-up.               Electronically Signed by: LAYTON PAREDES M.D.    Signed on: 11/7/2023 12:15 PM    Workstation ID: [id number]      US VASC LOWER EXTREMITY ARTERIES DUPLEX LEFT   Final Result      1. Patent left lower extremity arteries with flow velocities and waveforms   detailed above.      2.  There is atherosclerotic disease of the visualized left lower extremity   arteries. Decreased flow velocities in the below-knee arteries likely   represent sequela of multifocal stenosis/PAD.      3. Questionable left groin lymph nodes measure up to 1.6.         Electronically Signed by: SEKOU IBARRA DO    Signed on: 11/6/2023 6:59 PM    Workstation ID: [id number]      US LIVER GALLBLADDER PANCREAS (RUQ)   Final Result   1.  Hyperechoic foci along the gallbladder wall may be in part stones and   in part adenomyomatosis.   2. No biliary ductal dilation.   3.  Hepatic steatosis and mild hepatomegaly.      Electronically Signed by: LIN GARCIA M.D.    Signed on: 11/6/2023 8:30 AM    Workstation ID: [id number]      CT HEAD WO CONTRAST   Final Result         1. No evidence of an acute intracranial abnormality.   2. Moderately sized area of low-density in the right frontal, parietal and   temporal lobe may be secondary to  previous infarct or trauma although is of   uncertain chronicity given no comparisons.      If the patient's neurological symptoms persist and/or if concern for   hyperacute ischemia recommend further evaluation with MRI as clinically   warranted.      Electronically Signed by: LAYTON PAREDES M.D.    Signed on: 11/5/2023 2:47 PM    Workstation ID: [id number]      XR CHEST PA OR AP 1 VIEW   Final Result   FINDINGS/IMPRESSION:      There is a left-sided single-lead pacemaker with the lead projecting over   the right ventricle. The cardiac silhouette is not enlarged.       Subtle increased perihilar and interstitial opacities in the lung bases may   represent mild edema. No focal infiltrate, significant pleural effusion or   pneumothorax.         Electronically Signed by: LAYTON PAREDES M.D.    Signed on: 11/5/2023 1:10 PM    Workstation ID: [id number]           ASSESSMENT AND PLAN    Patient Active Problem List   Diagnosis   • Non-traumatic rhabdomyolysis       1. Elevated liver enzymes.  Could be multifactorial due to fatty liver and mild rhabdomyolysis.  However he also had a significant elevation of his bilirubin.  His bilirubin and LFTs are improving but still abnormal.  Bilirubin dropping daily.  If they do not continue to resolve towards normal would suggest an endoscopic ultrasound.

## 2023-12-08 RX ORDER — LEVOTHYROXINE SODIUM 0.1 MG/1
TABLET ORAL
Qty: 90 TABLET | Refills: 0 | Status: SHIPPED | OUTPATIENT
Start: 2023-12-08

## 2024-01-14 ENCOUNTER — PATIENT MESSAGE (OUTPATIENT)
Dept: INTERNAL MEDICINE | Age: 54
End: 2024-01-14

## 2024-01-15 NOTE — TELEPHONE ENCOUNTER
From: Alina Barclay  To: Dr. Lita Saenz  Sent: 1/14/2024 7:59 PM CST  Subject: Refill Custom Hormone Cream    I request this from pharmacy before Christmas and still don’t have it. You should have 2nd request from pharmacy on Monday. Please refill ASSP.

## 2024-03-01 DIAGNOSIS — Z00.00 ANNUAL PHYSICAL EXAM: ICD-10-CM

## 2024-03-01 DIAGNOSIS — E89.0 POSTSURGICAL HYPOTHYROIDISM: ICD-10-CM

## 2024-03-01 DIAGNOSIS — Z12.31 ENCOUNTER FOR SCREENING MAMMOGRAM FOR BREAST CANCER: ICD-10-CM

## 2024-03-01 DIAGNOSIS — F51.01 PRIMARY INSOMNIA: ICD-10-CM

## 2024-03-01 DIAGNOSIS — E55.9 VITAMIN D DEFICIENCY: ICD-10-CM

## 2024-03-01 DIAGNOSIS — R73.01 IFG (IMPAIRED FASTING GLUCOSE): ICD-10-CM

## 2024-03-01 LAB
25(OH)D3 SERPL-MCNC: 45.1 NG/ML
ALBUMIN SERPL-MCNC: 4.8 G/DL (ref 3.5–5.2)
ALP SERPL-CCNC: 97 U/L (ref 35–104)
ALT SERPL-CCNC: 19 U/L (ref 5–33)
ANION GAP SERPL CALCULATED.3IONS-SCNC: 15 MMOL/L (ref 7–19)
AST SERPL-CCNC: 19 U/L (ref 5–32)
BACTERIA URNS QL MICRO: ABNORMAL /HPF
BASOPHILS # BLD: 0 K/UL (ref 0–0.2)
BASOPHILS NFR BLD: 0.3 % (ref 0–1)
BILIRUB SERPL-MCNC: 0.6 MG/DL (ref 0.2–1.2)
BILIRUB UR QL STRIP: NEGATIVE
BUN SERPL-MCNC: 21 MG/DL (ref 6–20)
CALCIUM SERPL-MCNC: 9.5 MG/DL (ref 8.6–10)
CHLORIDE SERPL-SCNC: 106 MMOL/L (ref 98–111)
CHOLEST SERPL-MCNC: 225 MG/DL (ref 160–199)
CLARITY UR: ABNORMAL
CO2 SERPL-SCNC: 26 MMOL/L (ref 22–29)
COLOR UR: YELLOW
CREAT SERPL-MCNC: 0.8 MG/DL (ref 0.5–0.9)
CRYSTALS URNS MICRO: ABNORMAL /HPF
EOSINOPHIL # BLD: 0 K/UL (ref 0–0.6)
EOSINOPHIL NFR BLD: 0 % (ref 0–5)
EPI CELLS #/AREA URNS AUTO: 14 /HPF (ref 0–5)
ERYTHROCYTE [DISTWIDTH] IN BLOOD BY AUTOMATED COUNT: 12 % (ref 11.5–14.5)
GLUCOSE SERPL-MCNC: 92 MG/DL (ref 74–109)
GLUCOSE UR STRIP.AUTO-MCNC: NEGATIVE MG/DL
HBA1C MFR BLD: 4.7 % (ref 4–6)
HCT VFR BLD AUTO: 42.7 % (ref 37–47)
HDLC SERPL-MCNC: 54 MG/DL (ref 65–121)
HGB BLD-MCNC: 14.2 G/DL (ref 12–16)
HGB UR STRIP.AUTO-MCNC: NEGATIVE MG/L
HYALINE CASTS #/AREA URNS AUTO: 7 /HPF (ref 0–8)
IMM GRANULOCYTES # BLD: 0 K/UL
KETONES UR STRIP.AUTO-MCNC: NEGATIVE MG/DL
LDLC SERPL CALC-MCNC: 148 MG/DL
LEUKOCYTE ESTERASE UR QL STRIP.AUTO: ABNORMAL
LYMPHOCYTES # BLD: 1.5 K/UL (ref 1.1–4.5)
LYMPHOCYTES NFR BLD: 39.3 % (ref 20–40)
MCH RBC QN AUTO: 31.8 PG (ref 27–31)
MCHC RBC AUTO-ENTMCNC: 33.3 G/DL (ref 33–37)
MCV RBC AUTO: 95.7 FL (ref 81–99)
MONOCYTES # BLD: 0.3 K/UL (ref 0–0.9)
MONOCYTES NFR BLD: 8.4 % (ref 0–10)
NEUTROPHILS # BLD: 2 K/UL (ref 1.5–7.5)
NEUTS SEG NFR BLD: 52 % (ref 50–65)
NITRITE UR QL STRIP.AUTO: NEGATIVE
PH UR STRIP.AUTO: 5.5 [PH] (ref 5–8)
PLATELET # BLD AUTO: 294 K/UL (ref 130–400)
PMV BLD AUTO: 9.9 FL (ref 9.4–12.3)
POTASSIUM SERPL-SCNC: 4.3 MMOL/L (ref 3.5–5)
PROT SERPL-MCNC: 7.4 G/DL (ref 6.6–8.7)
PROT UR STRIP.AUTO-MCNC: ABNORMAL MG/DL
RBC # BLD AUTO: 4.46 M/UL (ref 4.2–5.4)
RBC #/AREA URNS AUTO: 1 /HPF (ref 0–4)
SODIUM SERPL-SCNC: 147 MMOL/L (ref 136–145)
SP GR UR STRIP.AUTO: 1.02 (ref 1–1.03)
T4 FREE SERPL-MCNC: 1.2 NG/DL (ref 0.93–1.7)
TRIGL SERPL-MCNC: 113 MG/DL (ref 0–149)
TSH SERPL DL<=0.005 MIU/L-ACNC: 6.64 UIU/ML (ref 0.27–4.2)
UROBILINOGEN UR STRIP.AUTO-MCNC: 1 E.U./DL
WBC # BLD AUTO: 3.8 K/UL (ref 4.8–10.8)
WBC #/AREA URNS AUTO: 8 /HPF (ref 0–5)

## 2024-03-06 ENCOUNTER — OFFICE VISIT (OUTPATIENT)
Dept: INTERNAL MEDICINE | Age: 54
End: 2024-03-06
Payer: COMMERCIAL

## 2024-03-06 VITALS
HEART RATE: 84 BPM | WEIGHT: 168.2 LBS | BODY MASS INDEX: 27.03 KG/M2 | HEIGHT: 66 IN | DIASTOLIC BLOOD PRESSURE: 78 MMHG | OXYGEN SATURATION: 98 % | SYSTOLIC BLOOD PRESSURE: 120 MMHG

## 2024-03-06 DIAGNOSIS — R73.01 IFG (IMPAIRED FASTING GLUCOSE): ICD-10-CM

## 2024-03-06 DIAGNOSIS — Z12.31 ENCOUNTER FOR SCREENING MAMMOGRAM FOR MALIGNANT NEOPLASM OF BREAST: ICD-10-CM

## 2024-03-06 DIAGNOSIS — E55.9 VITAMIN D DEFICIENCY: ICD-10-CM

## 2024-03-06 DIAGNOSIS — F51.01 PRIMARY INSOMNIA: ICD-10-CM

## 2024-03-06 DIAGNOSIS — Z78.0 MENOPAUSE: ICD-10-CM

## 2024-03-06 DIAGNOSIS — E89.0 POSTSURGICAL HYPOTHYROIDISM: ICD-10-CM

## 2024-03-06 DIAGNOSIS — Z00.00 ANNUAL PHYSICAL EXAM: Primary | ICD-10-CM

## 2024-03-06 PROCEDURE — 99396 PREV VISIT EST AGE 40-64: CPT | Performed by: INTERNAL MEDICINE

## 2024-03-06 RX ORDER — LEVOTHYROXINE SODIUM 112 UG/1
112 TABLET ORAL DAILY
Qty: 90 TABLET | Refills: 1 | Status: SHIPPED | OUTPATIENT
Start: 2024-03-06

## 2024-03-06 RX ORDER — OLOPATADINE HYDROCHLORIDE 1 MG/ML
1 SOLUTION/ DROPS OPHTHALMIC 2 TIMES DAILY
Qty: 1 EACH | Refills: 2 | Status: SHIPPED | OUTPATIENT
Start: 2024-03-06 | End: 2024-04-05

## 2024-03-06 RX ORDER — FEXOFENADINE HCL AND PSEUDOEPHEDRINE HCI 180; 240 MG/1; MG/1
1 TABLET, EXTENDED RELEASE ORAL DAILY
Qty: 90 TABLET | Refills: 1 | Status: SHIPPED | OUTPATIENT
Start: 2024-03-06

## 2024-03-06 ASSESSMENT — ENCOUNTER SYMPTOMS
WHEEZING: 0
CONSTIPATION: 0
COUGH: 0
CHEST TIGHTNESS: 0
ABDOMINAL PAIN: 0
SORE THROAT: 0

## 2024-03-06 ASSESSMENT — PATIENT HEALTH QUESTIONNAIRE - PHQ9
SUM OF ALL RESPONSES TO PHQ QUESTIONS 1-9: 0
SUM OF ALL RESPONSES TO PHQ QUESTIONS 1-9: 0
2. FEELING DOWN, DEPRESSED OR HOPELESS: 0
1. LITTLE INTEREST OR PLEASURE IN DOING THINGS: 0
SUM OF ALL RESPONSES TO PHQ9 QUESTIONS 1 & 2: 0
SUM OF ALL RESPONSES TO PHQ QUESTIONS 1-9: 0
SUM OF ALL RESPONSES TO PHQ QUESTIONS 1-9: 0

## 2024-03-06 NOTE — PROGRESS NOTES
Chief Complaint:   Alina Barclay is a 53 y.o. female who presents forcomplete physical exam.    History of Present Illness:      Alina Barclay is a 53 y.o. female who presents todayfor wellness visit AND follow up on her chronic medical conditions as noted below.    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/01/2020    Vitamin D deficiency 09/01/2020    IFG (impaired fasting glucose) 09/01/2020       Past Medical History:   Diagnosis Date    Hashimoto's thyroiditis        Past Surgical History:   Procedure Laterality Date    COLONOSCOPY  2012    dr Wick    DILATION AND CURETTAGE OF UTERUS      HYSTERECTOMY, TOTAL ABDOMINAL (CERVIX REMOVED)         Current Outpatient Medications   Medication Sig Dispense Refill    fexofenadine-pseudoephedrine (ALLEGRA-D ALLERGY & CONGESTION) 180-240 MG per extended release tablet Take 1 tablet by mouth daily 90 tablet 1    levothyroxine (SYNTHROID) 112 MCG tablet Take 1 tablet by mouth daily 90 tablet 1    olopatadine (PATANOL) 0.1 % ophthalmic solution Place 1 drop into both eyes 2 times daily 1 each 2    levothyroxine (SYNTHROID) 100 MCG tablet TAKE 1 TABLET BY MOUTH ONCE DAILY IN THE MORNING 90 tablet 0    QUEtiapine (SEROQUEL) 25 MG tablet TAKE 1 TABLET BY MOUTH NIGHTLY AS NEEDED (SLEEP). 90 tablet 1    montelukast (SINGULAIR) 10 MG tablet TAKE 1 TABLET BY MOUTH EVERY DAY 90 tablet 3    BREO ELLIPTA 200-25 MCG/ACT AEPB inhaler INHALE 2 PUFFS BY MOUTH INTO THE LUNGS DAILY 180 each 2    omalizumab (XOLAIR) 150 MG/ML SOSY injection Inject 300 mg into the skin every 28 days      Ibuprofen-Acetaminophen (ADVIL DUAL ACTION) 125-250 MG TABS Take by mouth in the morning and at bedtime      Vitamin D3 474285 UNIT/GM POWD, Food Color Green POWD, Cellulose POWD, Empty Capsule Size 3 Pnk/Clear CAPS TAKE 1 CAPSULE BY MOUTH ONCE WEEKLY. 4 each 3    Naltrexone HCl POWD TAKE 1 CAPSULE AT BEDTIME 90 g 3    cetirizine (ZYRTEC)

## 2024-03-08 ENCOUNTER — TRANSCRIBE ORDERS (OUTPATIENT)
Dept: ADMINISTRATIVE | Facility: HOSPITAL | Age: 54
End: 2024-03-08
Payer: COMMERCIAL

## 2024-03-08 DIAGNOSIS — Z12.31 ENCOUNTER FOR SCREENING MAMMOGRAM FOR MALIGNANT NEOPLASM OF BREAST: Primary | ICD-10-CM

## 2024-03-08 DIAGNOSIS — Z78.0 MENOPAUSE: ICD-10-CM

## 2024-03-12 ENCOUNTER — PATIENT MESSAGE (OUTPATIENT)
Dept: INTERNAL MEDICINE | Age: 54
End: 2024-03-12

## 2024-03-12 DIAGNOSIS — R30.9 PAINFUL URINATION: ICD-10-CM

## 2024-03-12 DIAGNOSIS — R82.90 BAD ODOR OF URINE: Primary | ICD-10-CM

## 2024-03-12 DIAGNOSIS — R82.90 BAD ODOR OF URINE: ICD-10-CM

## 2024-03-12 LAB
BACTERIA URNS QL MICRO: ABNORMAL /HPF
BILIRUB UR QL STRIP: NEGATIVE
CLARITY UR: ABNORMAL
COLOR UR: YELLOW
CRYSTALS URNS MICRO: ABNORMAL /HPF
EPI CELLS #/AREA URNS AUTO: 0 /HPF (ref 0–5)
GLUCOSE UR STRIP.AUTO-MCNC: NEGATIVE MG/DL
HGB UR STRIP.AUTO-MCNC: NEGATIVE MG/L
HYALINE CASTS #/AREA URNS AUTO: 1 /HPF (ref 0–8)
KETONES UR STRIP.AUTO-MCNC: ABNORMAL MG/DL
LEUKOCYTE ESTERASE UR QL STRIP.AUTO: ABNORMAL
NITRITE UR QL STRIP.AUTO: NEGATIVE
PH UR STRIP.AUTO: 5.5 [PH] (ref 5–8)
PROT UR STRIP.AUTO-MCNC: NEGATIVE MG/DL
RBC #/AREA URNS AUTO: 2 /HPF (ref 0–4)
SP GR UR STRIP.AUTO: 1.01 (ref 1–1.03)
UROBILINOGEN UR STRIP.AUTO-MCNC: 0.2 E.U./DL
WBC #/AREA URNS AUTO: 255 /HPF (ref 0–5)

## 2024-03-12 RX ORDER — CIPROFLOXACIN 250 MG/1
250 TABLET, FILM COATED ORAL 2 TIMES DAILY
Qty: 10 TABLET | Refills: 0 | Status: SHIPPED | OUTPATIENT
Start: 2024-03-12 | End: 2024-03-17

## 2024-03-12 NOTE — TELEPHONE ENCOUNTER
From: Alina Barclay  To: Dr. Lita Saenz  Sent: 3/12/2024 8:09 AM CDT  Subject: UTI    I got up this morning with foul smelling urine and pain when I pee. Can you call in meds for pain and antibiotic? Thanks

## 2024-03-15 LAB
BACTERIA UR CULT: ABNORMAL
BACTERIA UR CULT: ABNORMAL
ORGANISM: ABNORMAL

## 2024-03-28 NOTE — TELEPHONE ENCOUNTER
Alina Barclay called to request a refill on her medication.      Last office visit : 3/6/2024   Next office visit : 9/6/2024     Requested Prescriptions     Pending Prescriptions Disp Refills    Naltrexone HCl POWD 90 g 1     Sig: TAKE 1 CAPSULE AT BEDTIME            Kavya Iqbal MA

## 2024-04-11 RX ORDER — LEVOTHYROXINE SODIUM 0.1 MG/1
TABLET ORAL
Qty: 90 TABLET | Refills: 0 | OUTPATIENT
Start: 2024-04-11

## 2024-04-11 NOTE — TELEPHONE ENCOUNTER
Alina Barclay called to request a refill on her medication.      Last office visit : 3/6/2024   Next office visit : 9/6/2024     Requested Prescriptions     Pending Prescriptions Disp Refills    levothyroxine (SYNTHROID) 100 MCG tablet [Pharmacy Med Name: Levothyroxine Sodium 100 MCG Oral Tablet] 90 tablet 0     Sig: TAKE 1 TABLET BY MOUTH ONCE DAILY IN THE MORNING            Kavya Iqbal MA

## 2024-05-26 LAB
NCCN CRITERIA FLAG: NORMAL
TYRER CUZICK SCORE: 16.8

## 2024-06-03 RX ORDER — MONTELUKAST SODIUM 10 MG/1
10 TABLET ORAL DAILY
Qty: 90 TABLET | Refills: 1 | Status: SHIPPED | OUTPATIENT
Start: 2024-06-03

## 2024-06-03 NOTE — TELEPHONE ENCOUNTER
Alina Barclay called to request a refill on her medication.      Last office visit : 3/6/2024   Next office visit : 9/6/2024     Requested Prescriptions     Pending Prescriptions Disp Refills    montelukast (SINGULAIR) 10 MG tablet [Pharmacy Med Name: Montelukast Sodium 10 MG Oral Tablet] 90 tablet 0     Sig: Take 1 tablet by mouth once daily            Kavya Iqbal MA

## 2024-06-24 ENCOUNTER — HOSPITAL ENCOUNTER (OUTPATIENT)
Dept: MAMMOGRAPHY | Facility: HOSPITAL | Age: 54
Discharge: HOME | End: 2024-06-24
Payer: COMMERCIAL

## 2024-06-24 ENCOUNTER — HOSPITAL ENCOUNTER (OUTPATIENT)
Dept: BONE DENSITY | Facility: HOSPITAL | Age: 54
Discharge: HOME | End: 2024-06-24
Payer: COMMERCIAL

## 2024-06-24 DIAGNOSIS — Z78.0 MENOPAUSE: ICD-10-CM

## 2024-06-24 DIAGNOSIS — Z12.31 ENCOUNTER FOR SCREENING MAMMOGRAM FOR MALIGNANT NEOPLASM OF BREAST: ICD-10-CM

## 2024-06-24 PROCEDURE — 77080 DXA BONE DENSITY AXIAL: CPT

## 2024-06-24 PROCEDURE — 77067 SCR MAMMO BI INCL CAD: CPT

## 2024-06-24 PROCEDURE — 77063 BREAST TOMOSYNTHESIS BI: CPT

## 2024-06-25 DIAGNOSIS — Z12.31 ENCOUNTER FOR SCREENING MAMMOGRAM FOR MALIGNANT NEOPLASM OF BREAST: ICD-10-CM

## 2024-06-25 DIAGNOSIS — Z78.0 MENOPAUSE: ICD-10-CM

## 2024-06-25 DIAGNOSIS — R92.8 ABNORMAL MAMMOGRAM OF RIGHT BREAST: Primary | ICD-10-CM

## 2024-06-25 PROBLEM — M85.852 OSTEOPENIA OF LEFT HIP: Status: ACTIVE | Noted: 2024-06-25

## 2024-06-27 ENCOUNTER — TRANSCRIBE ORDERS (OUTPATIENT)
Dept: ADMINISTRATIVE | Facility: HOSPITAL | Age: 54
End: 2024-06-27
Payer: COMMERCIAL

## 2024-06-27 DIAGNOSIS — R92.8 ABNORMAL MAMMOGRAM OF RIGHT BREAST: Primary | ICD-10-CM

## 2024-06-27 RX ORDER — QUETIAPINE FUMARATE 25 MG/1
25 TABLET, FILM COATED ORAL NIGHTLY PRN
Qty: 180 TABLET | Refills: 1 | Status: SHIPPED | OUTPATIENT
Start: 2024-06-27

## 2024-06-27 NOTE — TELEPHONE ENCOUNTER
Last OV 3/6/2024  Next OV 9/6/2024      Requested Prescriptions     Pending Prescriptions Disp Refills    QUEtiapine (SEROQUEL) 25 MG tablet [Pharmacy Med Name: QUEtiapine Fumarate 25 MG Oral Tablet] 180 tablet 1     Sig: TAKE 1 TABLET BY MOUTH NIGHTLY AS NEEDED FOR SLEEP

## 2024-07-31 ENCOUNTER — HOSPITAL ENCOUNTER (OUTPATIENT)
Dept: ULTRASOUND IMAGING | Facility: HOSPITAL | Age: 54
Discharge: HOME | End: 2024-07-31
Admitting: INTERNAL MEDICINE
Payer: COMMERCIAL

## 2024-07-31 DIAGNOSIS — R92.8 ABNORMAL MAMMOGRAM: ICD-10-CM

## 2024-07-31 PROCEDURE — 76642 ULTRASOUND BREAST LIMITED: CPT

## 2024-08-27 RX ORDER — LEVOTHYROXINE SODIUM 112 UG/1
112 TABLET ORAL DAILY
Qty: 90 TABLET | Refills: 0 | Status: SHIPPED | OUTPATIENT
Start: 2024-08-27

## 2024-08-27 NOTE — TELEPHONE ENCOUNTER
Alina Barclay called to request a refill on her medication.      Last office visit : 3/6/2024   Next office visit : 9/6/2024     Requested Prescriptions     Pending Prescriptions Disp Refills    levothyroxine (SYNTHROID) 112 MCG tablet [Pharmacy Med Name: Levothyroxine Sodium 112 MCG Oral Tablet] 90 tablet 0     Sig: Take 1 tablet by mouth once daily            April Katie Drew MA

## 2024-08-30 DIAGNOSIS — R73.01 IFG (IMPAIRED FASTING GLUCOSE): ICD-10-CM

## 2024-08-30 DIAGNOSIS — Z12.31 ENCOUNTER FOR SCREENING MAMMOGRAM FOR MALIGNANT NEOPLASM OF BREAST: ICD-10-CM

## 2024-08-30 DIAGNOSIS — Z00.00 ANNUAL PHYSICAL EXAM: ICD-10-CM

## 2024-08-30 DIAGNOSIS — E55.9 VITAMIN D DEFICIENCY: ICD-10-CM

## 2024-08-30 DIAGNOSIS — E89.0 POSTSURGICAL HYPOTHYROIDISM: ICD-10-CM

## 2024-08-30 DIAGNOSIS — F51.01 PRIMARY INSOMNIA: ICD-10-CM

## 2024-08-30 LAB
ALBUMIN SERPL-MCNC: 4.4 G/DL (ref 3.5–5.2)
ALP SERPL-CCNC: 95 U/L (ref 35–104)
ALT SERPL-CCNC: 19 U/L (ref 5–33)
ANION GAP SERPL CALCULATED.3IONS-SCNC: 11 MMOL/L (ref 7–19)
AST SERPL-CCNC: 19 U/L (ref 5–32)
BILIRUB SERPL-MCNC: 0.7 MG/DL (ref 0.2–1.2)
BUN SERPL-MCNC: 22 MG/DL (ref 6–20)
CALCIUM SERPL-MCNC: 9.3 MG/DL (ref 8.6–10)
CHLORIDE SERPL-SCNC: 106 MMOL/L (ref 98–111)
CHOLEST SERPL-MCNC: 200 MG/DL (ref 0–199)
CO2 SERPL-SCNC: 28 MMOL/L (ref 22–29)
CREAT SERPL-MCNC: 0.8 MG/DL (ref 0.5–0.9)
GLUCOSE SERPL-MCNC: 91 MG/DL (ref 70–99)
HDLC SERPL-MCNC: 56 MG/DL (ref 40–60)
LDLC SERPL CALC-MCNC: 125 MG/DL
POTASSIUM SERPL-SCNC: 4.2 MMOL/L (ref 3.5–5)
PROT SERPL-MCNC: 6.6 G/DL (ref 6.4–8.3)
SODIUM SERPL-SCNC: 145 MMOL/L (ref 136–145)
T4 FREE SERPL-MCNC: 1.79 NG/DL (ref 0.93–1.7)
TRIGL SERPL-MCNC: 97 MG/DL (ref 0–149)
TSH SERPL DL<=0.005 MIU/L-ACNC: 0.43 UIU/ML (ref 0.27–4.2)

## 2024-09-06 ENCOUNTER — TRANSCRIBE ORDERS (OUTPATIENT)
Dept: ADMINISTRATIVE | Facility: HOSPITAL | Age: 54
End: 2024-09-06
Payer: COMMERCIAL

## 2024-09-06 ENCOUNTER — OFFICE VISIT (OUTPATIENT)
Dept: INTERNAL MEDICINE | Age: 54
End: 2024-09-06
Payer: COMMERCIAL

## 2024-09-06 VITALS
OXYGEN SATURATION: 96 % | HEIGHT: 66 IN | SYSTOLIC BLOOD PRESSURE: 130 MMHG | DIASTOLIC BLOOD PRESSURE: 82 MMHG | HEART RATE: 77 BPM | WEIGHT: 157.6 LBS | BODY MASS INDEX: 25.33 KG/M2

## 2024-09-06 DIAGNOSIS — E55.9 VITAMIN D DEFICIENCY: ICD-10-CM

## 2024-09-06 DIAGNOSIS — F51.01 PRIMARY INSOMNIA: ICD-10-CM

## 2024-09-06 DIAGNOSIS — E89.0 POSTSURGICAL HYPOTHYROIDISM: ICD-10-CM

## 2024-09-06 DIAGNOSIS — J30.2 SEASONAL ALLERGIES: ICD-10-CM

## 2024-09-06 DIAGNOSIS — R92.8 ABNORMAL MAMMOGRAM OF RIGHT BREAST: Primary | ICD-10-CM

## 2024-09-06 DIAGNOSIS — R73.01 IFG (IMPAIRED FASTING GLUCOSE): Primary | ICD-10-CM

## 2024-09-06 DIAGNOSIS — J34.89 SINUS PRESSURE: ICD-10-CM

## 2024-09-06 PROCEDURE — 99214 OFFICE O/P EST MOD 30 MIN: CPT | Performed by: INTERNAL MEDICINE

## 2024-09-06 RX ORDER — MONTELUKAST SODIUM 10 MG/1
10 TABLET ORAL DAILY
Qty: 90 TABLET | Refills: 1 | Status: SHIPPED | OUTPATIENT
Start: 2024-09-06

## 2024-09-06 RX ORDER — FEXOFENADINE HYDROCHLORIDE AND PSEUDOEPHEDRINE HYDROCHLORIDE 180; 240 MG/1; MG/1
1 TABLET, FILM COATED, EXTENDED RELEASE ORAL DAILY
Qty: 90 TABLET | Refills: 0 | Status: SHIPPED | OUTPATIENT
Start: 2024-09-06

## 2024-09-06 SDOH — ECONOMIC STABILITY: FOOD INSECURITY: WITHIN THE PAST 12 MONTHS, YOU WORRIED THAT YOUR FOOD WOULD RUN OUT BEFORE YOU GOT MONEY TO BUY MORE.: NEVER TRUE

## 2024-09-06 SDOH — ECONOMIC STABILITY: INCOME INSECURITY: HOW HARD IS IT FOR YOU TO PAY FOR THE VERY BASICS LIKE FOOD, HOUSING, MEDICAL CARE, AND HEATING?: NOT HARD AT ALL

## 2024-09-06 SDOH — ECONOMIC STABILITY: FOOD INSECURITY: WITHIN THE PAST 12 MONTHS, THE FOOD YOU BOUGHT JUST DIDN'T LAST AND YOU DIDN'T HAVE MONEY TO GET MORE.: NEVER TRUE

## 2024-09-06 ASSESSMENT — PATIENT HEALTH QUESTIONNAIRE - PHQ9
1. LITTLE INTEREST OR PLEASURE IN DOING THINGS: NOT AT ALL
SUM OF ALL RESPONSES TO PHQ QUESTIONS 1-9: 0
2. FEELING DOWN, DEPRESSED OR HOPELESS: NOT AT ALL
SUM OF ALL RESPONSES TO PHQ QUESTIONS 1-9: 0
SUM OF ALL RESPONSES TO PHQ9 QUESTIONS 1 & 2: 0

## 2024-09-06 ASSESSMENT — ENCOUNTER SYMPTOMS
WHEEZING: 0
ABDOMINAL PAIN: 0
SORE THROAT: 0
CONSTIPATION: 0
COUGH: 0
CHEST TIGHTNESS: 0

## 2024-09-06 NOTE — PROGRESS NOTES
Chief Complaint   Patient presents with    6 Month Follow-Up     Sinus pressure times one week denies chest/head congestion      History of presenting illness:  Alina Barclay is a54 y.o. female who presents today for follow up on her chronic medical conditions as noted below.    Patient Active Problem List    Diagnosis Date Noted    Osteopenia of left hip 06/25/2024     Overview Note:     6/2024 T-score hip -1.5, lumbar normal      Hyperthyroidism 09/01/2020    Autoimmune thyroiditis 09/01/2020    Vitamin D deficiency 09/01/2020    IFG (impaired fasting glucose) 09/01/2020     Past Medical History:   Diagnosis Date    Hashimoto's thyroiditis       Past Surgical History:   Procedure Laterality Date    COLONOSCOPY  2012    dr Wick    DILATION AND CURETTAGE OF UTERUS      HYSTERECTOMY, TOTAL ABDOMINAL (CERVIX REMOVED)       Current Outpatient Medications   Medication Sig Dispense Refill    montelukast (SINGULAIR) 10 MG tablet Take 1 tablet by mouth daily 90 tablet 1    levothyroxine (SYNTHROID) 112 MCG tablet Take 1 tablet by mouth once daily 90 tablet 0    QUEtiapine (SEROQUEL) 25 MG tablet TAKE 1 TABLET BY MOUTH NIGHTLY AS NEEDED FOR SLEEP 180 tablet 1    Naltrexone HCl POWD TAKE 1 CAPSULE AT BEDTIME 90 g 1    fexofenadine-pseudoephedrine (ALLEGRA-D ALLERGY & CONGESTION) 180-240 MG per extended release tablet Take 1 tablet by mouth daily 90 tablet 1    omalizumab (XOLAIR) 150 MG/ML SOSY injection Inject 300 mg into the skin every 28 days      Ibuprofen-Acetaminophen (ADVIL DUAL ACTION) 125-250 MG TABS Take by mouth in the morning and at bedtime      Vitamin D3 577537 UNIT/GM POWD, Food Color Green POWD, Cellulose POWD, Empty Capsule Size 3 Pnk/Clear CAPS TAKE 1 CAPSULE BY MOUTH ONCE WEEKLY. 4 each 3    fluticasone (VERAMYST) 27.5 MCG/SPRAY nasal spray 2 sprays by Each Nostril route daily       No current facility-administered medications for this visit.     No Known Allergies  Social History     Tobacco Use

## 2024-09-13 ENCOUNTER — TELEPHONE (OUTPATIENT)
Dept: INTERNAL MEDICINE | Age: 54
End: 2024-09-13

## 2024-11-26 RX ORDER — LEVOTHYROXINE SODIUM 112 UG/1
112 TABLET ORAL DAILY
Qty: 90 TABLET | Refills: 1 | Status: SHIPPED | OUTPATIENT
Start: 2024-11-26

## 2024-11-26 NOTE — TELEPHONE ENCOUNTER
Alina Barclay called to request a refill on her medication.      Last office visit : 9/6/2024   Next office visit : 3/7/2025     Requested Prescriptions     Pending Prescriptions Disp Refills    levothyroxine (SYNTHROID) 112 MCG tablet [Pharmacy Med Name: Levothyroxine Sodium 112 MCG Oral Tablet] 90 tablet 0     Sig: Take 1 tablet by mouth once daily            Kavya Iqbal MA

## 2024-12-06 RX ORDER — FEXOFENADINE HYDROCHLORIDE AND PSEUDOEPHEDRINE HYDROCHLORIDE 180; 240 MG/1; MG/1
1 TABLET, FILM COATED, EXTENDED RELEASE ORAL DAILY
Qty: 90 TABLET | Refills: 0 | Status: SHIPPED | OUTPATIENT
Start: 2024-12-06

## 2024-12-06 NOTE — TELEPHONE ENCOUNTER
Alina called requesting a refill of the below medication which has been pended for you:     Requested Prescriptions     Pending Prescriptions Disp Refills    ALLEGRA-D ALLERGY & CONGESTION 180-240 MG per extended release tablet [Pharmacy Med Name: Allegra-D Allergy & Congestion 180-240 MG Oral Tablet Extended Release 24 Hour] 90 tablet 0     Sig: Take 1 tablet by mouth once daily       Last Appointment Date: 9/6/2024  Next Appointment Date: 3/7/2025

## 2025-02-28 DIAGNOSIS — F51.01 PRIMARY INSOMNIA: ICD-10-CM

## 2025-02-28 DIAGNOSIS — E89.0 POSTSURGICAL HYPOTHYROIDISM: ICD-10-CM

## 2025-02-28 DIAGNOSIS — R73.01 IFG (IMPAIRED FASTING GLUCOSE): ICD-10-CM

## 2025-02-28 DIAGNOSIS — E55.9 VITAMIN D DEFICIENCY: ICD-10-CM

## 2025-02-28 LAB
25(OH)D3 SERPL-MCNC: 47.7 NG/ML
ALBUMIN SERPL-MCNC: 4.7 G/DL (ref 3.5–5.2)
ALP SERPL-CCNC: 81 U/L (ref 35–104)
ALT SERPL-CCNC: 24 U/L (ref 5–33)
ANION GAP SERPL CALCULATED.3IONS-SCNC: 12 MMOL/L (ref 8–16)
AST SERPL-CCNC: 19 U/L (ref 5–32)
BACTERIA URNS QL MICRO: NORMAL /HPF
BASOPHILS # BLD: 0 K/UL (ref 0–0.2)
BASOPHILS NFR BLD: 0.5 % (ref 0–1)
BILIRUB SERPL-MCNC: 0.5 MG/DL (ref 0.2–1.2)
BILIRUB UR QL STRIP: NEGATIVE
BUN SERPL-MCNC: 24 MG/DL (ref 6–20)
CALCIUM SERPL-MCNC: 9.1 MG/DL (ref 8.6–10)
CHLORIDE SERPL-SCNC: 105 MMOL/L (ref 98–107)
CHOLEST SERPL-MCNC: 233 MG/DL (ref 0–199)
CLARITY UR: CLEAR
CO2 SERPL-SCNC: 28 MMOL/L (ref 22–29)
COLOR UR: YELLOW
CREAT SERPL-MCNC: 0.8 MG/DL (ref 0.5–0.9)
CRYSTALS URNS MICRO: NORMAL /HPF
EOSINOPHIL # BLD: 0 K/UL (ref 0–0.6)
EOSINOPHIL NFR BLD: 0 % (ref 0–5)
EPI CELLS #/AREA URNS AUTO: 5 /HPF (ref 0–5)
ERYTHROCYTE [DISTWIDTH] IN BLOOD BY AUTOMATED COUNT: 13.1 % (ref 11.5–14.5)
GLUCOSE SERPL-MCNC: 100 MG/DL (ref 70–99)
GLUCOSE UR STRIP.AUTO-MCNC: NEGATIVE MG/DL
HCT VFR BLD AUTO: 42 % (ref 37–47)
HDLC SERPL-MCNC: 69 MG/DL (ref 40–60)
HGB BLD-MCNC: 13.6 G/DL (ref 12–16)
HGB UR STRIP.AUTO-MCNC: NEGATIVE MG/L
HYALINE CASTS #/AREA URNS AUTO: 1 /HPF (ref 0–8)
IMM GRANULOCYTES # BLD: 0 K/UL
KETONES UR STRIP.AUTO-MCNC: NEGATIVE MG/DL
LDLC SERPL CALC-MCNC: 145 MG/DL
LEUKOCYTE ESTERASE UR QL STRIP.AUTO: ABNORMAL
LYMPHOCYTES # BLD: 1.6 K/UL (ref 1.1–4.5)
LYMPHOCYTES NFR BLD: 38 % (ref 20–40)
MCH RBC QN AUTO: 31.7 PG (ref 27–31)
MCHC RBC AUTO-ENTMCNC: 32.4 G/DL (ref 33–37)
MCV RBC AUTO: 97.9 FL (ref 81–99)
MONOCYTES # BLD: 0.4 K/UL (ref 0–0.9)
MONOCYTES NFR BLD: 9.2 % (ref 0–10)
NEUTROPHILS # BLD: 2.2 K/UL (ref 1.5–7.5)
NEUTS SEG NFR BLD: 52.3 % (ref 50–65)
NITRITE UR QL STRIP.AUTO: NEGATIVE
PH UR STRIP.AUTO: 5.5 [PH] (ref 5–8)
PLATELET # BLD AUTO: 300 K/UL (ref 130–400)
PMV BLD AUTO: 9.6 FL (ref 9.4–12.3)
POTASSIUM SERPL-SCNC: 4.3 MMOL/L (ref 3.5–5.1)
PROT SERPL-MCNC: 6.9 G/DL (ref 6.4–8.3)
PROT UR STRIP.AUTO-MCNC: NEGATIVE MG/DL
RBC # BLD AUTO: 4.29 M/UL (ref 4.2–5.4)
RBC #/AREA URNS AUTO: 2 /HPF (ref 0–4)
SODIUM SERPL-SCNC: 145 MMOL/L (ref 136–145)
SP GR UR STRIP.AUTO: 1.02 (ref 1–1.03)
TRIGL SERPL-MCNC: 94 MG/DL (ref 0–149)
TSH SERPL DL<=0.005 MIU/L-ACNC: 0.72 UIU/ML (ref 0.27–4.2)
UROBILINOGEN UR STRIP.AUTO-MCNC: 0.2 E.U./DL
WBC # BLD AUTO: 4.3 K/UL (ref 4.8–10.8)
WBC #/AREA URNS AUTO: 1 /HPF (ref 0–5)

## 2025-03-04 RX ORDER — FEXOFENADINE HYDROCHLORIDE AND PSEUDOEPHEDRINE HYDROCHLORIDE 180; 240 MG/1; MG/1
1 TABLET, FILM COATED, EXTENDED RELEASE ORAL DAILY
Qty: 90 TABLET | Refills: 1 | Status: SHIPPED | OUTPATIENT
Start: 2025-03-04

## 2025-03-04 NOTE — TELEPHONE ENCOUNTER
Alina Barclay called to request a refill on her medication.      Last office visit : 9/6/2024   Next office visit : 3/12/2025     Requested Prescriptions     Pending Prescriptions Disp Refills    ALLEGRA-D ALLERGY & CONGESTION 180-240 MG per extended release tablet [Pharmacy Med Name: Allegra-D Allergy & Congestion 180-240 MG Oral Tablet Extended Release 24 Hour] 90 tablet 0     Sig: Take 1 tablet by mouth once daily            Kavya Iqbal MA

## 2025-03-09 ASSESSMENT — PATIENT HEALTH QUESTIONNAIRE - PHQ9
SUM OF ALL RESPONSES TO PHQ QUESTIONS 1-9: 0
1. LITTLE INTEREST OR PLEASURE IN DOING THINGS: NOT AT ALL
2. FEELING DOWN, DEPRESSED OR HOPELESS: NOT AT ALL
SUM OF ALL RESPONSES TO PHQ QUESTIONS 1-9: 0

## 2025-03-12 ENCOUNTER — OFFICE VISIT (OUTPATIENT)
Dept: INTERNAL MEDICINE | Age: 55
End: 2025-03-12
Payer: COMMERCIAL

## 2025-03-12 VITALS
HEART RATE: 73 BPM | DIASTOLIC BLOOD PRESSURE: 88 MMHG | SYSTOLIC BLOOD PRESSURE: 136 MMHG | HEIGHT: 66 IN | BODY MASS INDEX: 27.19 KG/M2 | WEIGHT: 169.2 LBS | OXYGEN SATURATION: 98 %

## 2025-03-12 DIAGNOSIS — R73.01 IFG (IMPAIRED FASTING GLUCOSE): ICD-10-CM

## 2025-03-12 DIAGNOSIS — Z12.31 ENCOUNTER FOR SCREENING MAMMOGRAM FOR MALIGNANT NEOPLASM OF BREAST: Primary | ICD-10-CM

## 2025-03-12 DIAGNOSIS — F51.01 PRIMARY INSOMNIA: ICD-10-CM

## 2025-03-12 DIAGNOSIS — E89.0 POSTSURGICAL HYPOTHYROIDISM: ICD-10-CM

## 2025-03-12 DIAGNOSIS — R92.8 ABNORMAL MAMMOGRAM OF RIGHT BREAST: Primary | ICD-10-CM

## 2025-03-12 DIAGNOSIS — Z00.00 ANNUAL PHYSICAL EXAM: ICD-10-CM

## 2025-03-12 DIAGNOSIS — E55.9 VITAMIN D DEFICIENCY: ICD-10-CM

## 2025-03-12 DIAGNOSIS — Z78.0 MENOPAUSE: ICD-10-CM

## 2025-03-12 PROCEDURE — 99396 PREV VISIT EST AGE 40-64: CPT | Performed by: INTERNAL MEDICINE

## 2025-03-12 RX ORDER — EZETIMIBE 10 MG/1
10 TABLET ORAL DAILY
Qty: 90 TABLET | Refills: 1 | Status: SHIPPED | OUTPATIENT
Start: 2025-03-12

## 2025-03-12 SDOH — ECONOMIC STABILITY: FOOD INSECURITY: WITHIN THE PAST 12 MONTHS, THE FOOD YOU BOUGHT JUST DIDN'T LAST AND YOU DIDN'T HAVE MONEY TO GET MORE.: NEVER TRUE

## 2025-03-12 SDOH — ECONOMIC STABILITY: FOOD INSECURITY: WITHIN THE PAST 12 MONTHS, YOU WORRIED THAT YOUR FOOD WOULD RUN OUT BEFORE YOU GOT MONEY TO BUY MORE.: NEVER TRUE

## 2025-03-12 ASSESSMENT — ENCOUNTER SYMPTOMS
ABDOMINAL PAIN: 0
CONSTIPATION: 0
WHEEZING: 0
COUGH: 0
SORE THROAT: 0
CHEST TIGHTNESS: 0

## 2025-03-12 ASSESSMENT — PATIENT HEALTH QUESTIONNAIRE - PHQ9
2. FEELING DOWN, DEPRESSED OR HOPELESS: NOT AT ALL
1. LITTLE INTEREST OR PLEASURE IN DOING THINGS: NOT AT ALL
SUM OF ALL RESPONSES TO PHQ9 QUESTIONS 1 & 2: 0

## 2025-03-12 NOTE — PROGRESS NOTES
Chief Complaint:   Alina Barclay is a 54 y.o. female who presents forcomplete physical exam.    History of Present Illness:      Alina Barclay is a 54 y.o. female who presents todayfor wellness visit AND follow up on her chronic medical conditions as noted below.    Patient Active Problem List    Diagnosis Date Noted    Osteopenia of left hip 06/25/2024 6/2024 T-score hip -1.5, lumbar normal      Hyperthyroidism 09/01/2020    Autoimmune thyroiditis 09/01/2020    Vitamin D deficiency 09/01/2020    IFG (impaired fasting glucose) 09/01/2020       Past Medical History:   Diagnosis Date    Hashimoto's thyroiditis        Past Surgical History:   Procedure Laterality Date    COLONOSCOPY  2012    dr Wick    DILATION AND CURETTAGE OF UTERUS      HYSTERECTOMY, TOTAL ABDOMINAL (CERVIX REMOVED)         Current Outpatient Medications   Medication Sig Dispense Refill    Acetaminophen (TYLENOL ARTHRITIS PAIN PO) Take by mouth      ELDERBERRY PO Take by mouth      Probiotic Product (PROBIOTIC BLEND PO) Take by mouth      ezetimibe (ZETIA) 10 MG tablet Take 1 tablet by mouth daily 90 tablet 1    ALLEGRA-D ALLERGY & CONGESTION 180-240 MG per extended release tablet Take 1 tablet by mouth once daily 90 tablet 1    levothyroxine (SYNTHROID) 112 MCG tablet Take 1 tablet by mouth once daily 90 tablet 1    montelukast (SINGULAIR) 10 MG tablet Take 1 tablet by mouth daily 90 tablet 1    QUEtiapine (SEROQUEL) 25 MG tablet TAKE 1 TABLET BY MOUTH NIGHTLY AS NEEDED FOR SLEEP 180 tablet 1    omalizumab (XOLAIR) 150 MG/ML SOSY injection Inject 300 mg into the skin every 28 days      Vitamin D3 [id number] UNIT/GM POWD, Food Color Green POWD, Cellulose POWD, Empty Capsule Size 3 Pnk/Clear CAPS TAKE 1 CAPSULE BY MOUTH ONCE WEEKLY. 4 each 3    fluticasone (VERAMYST) 27.5 MCG/SPRAY nasal spray 2 sprays by Each Nostril route daily       No current facility-administered medications for this visit.     No Known Allergies    Social History

## 2025-03-14 ENCOUNTER — TRANSCRIBE ORDERS (OUTPATIENT)
Dept: ADMINISTRATIVE | Facility: HOSPITAL | Age: 55
End: 2025-03-14
Payer: COMMERCIAL

## 2025-03-14 DIAGNOSIS — Z12.31 ENCOUNTER FOR SCREENING MAMMOGRAM FOR MALIGNANT NEOPLASM OF BREAST: Primary | ICD-10-CM

## 2025-03-18 ENCOUNTER — APPOINTMENT (OUTPATIENT)
Dept: ULTRASOUND IMAGING | Facility: HOSPITAL | Age: 55
End: 2025-03-18
Payer: COMMERCIAL

## 2025-03-21 ENCOUNTER — HOSPITAL ENCOUNTER (OUTPATIENT)
Dept: MAMMOGRAPHY | Facility: HOSPITAL | Age: 55
Discharge: HOME | End: 2025-03-21
Payer: COMMERCIAL

## 2025-03-21 ENCOUNTER — HOSPITAL ENCOUNTER (OUTPATIENT)
Dept: ULTRASOUND IMAGING | Facility: HOSPITAL | Age: 55
Discharge: HOME | End: 2025-03-21
Payer: COMMERCIAL

## 2025-03-21 ENCOUNTER — PATIENT MESSAGE (OUTPATIENT)
Dept: INTERNAL MEDICINE | Age: 55
End: 2025-03-21

## 2025-03-21 DIAGNOSIS — R92.8 ABNORMAL MAMMOGRAM OF RIGHT BREAST: ICD-10-CM

## 2025-03-21 DIAGNOSIS — Z12.31 ENCOUNTER FOR SCREENING MAMMOGRAM FOR BREAST CANCER: Primary | ICD-10-CM

## 2025-03-21 PROCEDURE — G0279 TOMOSYNTHESIS, MAMMO: HCPCS

## 2025-03-21 PROCEDURE — 77065 DX MAMMO INCL CAD UNI: CPT

## 2025-03-21 PROCEDURE — 76642 ULTRASOUND BREAST LIMITED: CPT

## 2025-03-25 ENCOUNTER — RESULTS FOLLOW-UP (OUTPATIENT)
Dept: INTERNAL MEDICINE | Age: 55
End: 2025-03-25

## 2025-03-25 DIAGNOSIS — R92.8 ABNORMAL MAMMOGRAM OF RIGHT BREAST: ICD-10-CM

## 2025-05-19 RX ORDER — LEVOTHYROXINE SODIUM 112 UG/1
112 TABLET ORAL DAILY
Qty: 90 TABLET | Refills: 1 | Status: SHIPPED | OUTPATIENT
Start: 2025-05-19

## 2025-05-19 NOTE — TELEPHONE ENCOUNTER
Alina Barclay called to request a refill on her medication.      Last office visit : 3/12/2025   Next office visit : 9/8/2025     Requested Prescriptions     Pending Prescriptions Disp Refills    levothyroxine (SYNTHROID) 112 MCG tablet [Pharmacy Med Name: Levothyroxine Sodium 112 MCG Oral Tablet] 90 tablet 0     Sig: Take 1 tablet by mouth once daily            Kavya Iqbal MA

## 2025-05-27 RX ORDER — MONTELUKAST SODIUM 10 MG/1
10 TABLET ORAL DAILY
Qty: 90 TABLET | Refills: 1 | Status: SHIPPED | OUTPATIENT
Start: 2025-05-27

## 2025-05-27 NOTE — TELEPHONE ENCOUNTER
Alian Barclay called to request a refill on her medication.      Last office visit : 3/12/2025   Next office visit : 9/8/2025     Requested Prescriptions     Pending Prescriptions Disp Refills    montelukast (SINGULAIR) 10 MG tablet [Pharmacy Med Name: Montelukast Sodium 10 MG Oral Tablet] 90 tablet 0     Sig: Take 1 tablet by mouth once daily            Kavya Iqbal MA

## 2025-06-10 NOTE — PROGRESS NOTES
for gel injection bilateral knee.    Electronically signed by SIN Purcell on 6/11/2025 at 5:08 PM.

## 2025-06-11 ENCOUNTER — OFFICE VISIT (OUTPATIENT)
Age: 55
End: 2025-06-11
Payer: COMMERCIAL

## 2025-06-11 VITALS — HEIGHT: 66 IN | BODY MASS INDEX: 28.48 KG/M2 | WEIGHT: 177.2 LBS

## 2025-06-11 DIAGNOSIS — M17.0 BILATERAL PRIMARY OSTEOARTHRITIS OF KNEE: ICD-10-CM

## 2025-06-11 DIAGNOSIS — M25.562 PAIN IN BOTH KNEES, UNSPECIFIED CHRONICITY: Primary | ICD-10-CM

## 2025-06-11 DIAGNOSIS — M25.561 PAIN IN BOTH KNEES, UNSPECIFIED CHRONICITY: Primary | ICD-10-CM

## 2025-06-11 PROCEDURE — 99203 OFFICE O/P NEW LOW 30 MIN: CPT | Performed by: PHYSICIAN ASSISTANT

## 2025-06-11 RX ORDER — MELOXICAM 15 MG/1
15 TABLET ORAL DAILY
Qty: 30 TABLET | Refills: 3 | Status: SHIPPED | OUTPATIENT
Start: 2025-06-11

## 2025-06-18 ENCOUNTER — TELEPHONE (OUTPATIENT)
Age: 55
End: 2025-06-18

## 2025-06-18 NOTE — TELEPHONE ENCOUNTER
Patient is calling stating someone was trying to reach her but wasn't sure. Please return patients call

## 2025-06-25 NOTE — PROGRESS NOTES
[facility]  [facility]  [address]  [facility] [address] [phone]   Orthopaedic Clinic Note-Established Patient     NAME:  Alina Barclay   : 1970  MRN: [id number]      2025     CHIEF COMPLAINT: Bilateral knee pain    HISTORY OF PRESENT ILLNESS:   Alina is a 54 y.o. female who presents to the office for evaluation and treatment of bilateral knees.  Patient has been having symptoms of arthritis for years which were made worse by fall in 2024.  She is attempted PT and steroid injections.  She is here to start gel injections.    Past Medical History:        Diagnosis Date    Hashimoto's thyroiditis        Past Surgical History:        Procedure Laterality Date    COLONOSCOPY      dr Wick    DILATION AND CURETTAGE OF UTERUS      HYSTERECTOMY, TOTAL ABDOMINAL (CERVIX REMOVED)         Current Medications:   Prior to Admission medications    Medication Sig Start Date End Date Taking? Authorizing Provider   meloxicam (MOBIC) 15 MG tablet Take 1 tablet by mouth daily Take one (1) tablet by oral route every day 25  Yes Pieter Lima PA   montelukast (SINGULAIR) 10 MG tablet Take 1 tablet by mouth once daily 25  Yes Lita Saenz MD   levothyroxine (SYNTHROID) 112 MCG tablet Take 1 tablet by mouth once daily 25  Yes Lita Saenz MD   Acetaminophen (TYLENOL ARTHRITIS PAIN PO) Take by mouth   Yes Coleman Milner MD   ELDERBERRY PO Take by mouth   Yes Coleman Milner MD   Probiotic Product (PROBIOTIC BLEND PO) Take by mouth   Yes Coleman Milner MD   ezetimibe (ZETIA) 10 MG tablet Take 1 tablet by mouth daily 3/12/25  Yes Lita Saenz MD   ALLEGRA-D ALLERGY & CONGESTION 180-240 MG per extended release tablet Take 1 tablet by mouth once daily 3/4/25  Yes Lita Saenz MD   QUEtiapine (SEROQUEL) 25 MG tablet TAKE 1 TABLET BY MOUTH NIGHTLY AS NEEDED FOR SLEEP 24  Yes Lita Saenz MD

## 2025-06-26 ENCOUNTER — OFFICE VISIT (OUTPATIENT)
Age: 55
End: 2025-06-26

## 2025-06-26 ENCOUNTER — HOSPITAL ENCOUNTER (OUTPATIENT)
Dept: MAMMOGRAPHY | Facility: HOSPITAL | Age: 55
Discharge: HOME | End: 2025-06-26
Admitting: INTERNAL MEDICINE
Payer: COMMERCIAL

## 2025-06-26 VITALS — BODY MASS INDEX: 28.06 KG/M2 | WEIGHT: 174.6 LBS | HEIGHT: 66 IN

## 2025-06-26 DIAGNOSIS — Z12.31 ENCOUNTER FOR SCREENING MAMMOGRAM FOR MALIGNANT NEOPLASM OF BREAST: ICD-10-CM

## 2025-06-26 DIAGNOSIS — M17.0 BILATERAL PRIMARY OSTEOARTHRITIS OF KNEE: Primary | ICD-10-CM

## 2025-06-26 PROCEDURE — 77067 SCR MAMMO BI INCL CAD: CPT

## 2025-06-26 PROCEDURE — 77063 BREAST TOMOSYNTHESIS BI: CPT

## 2025-06-27 ENCOUNTER — RESULTS FOLLOW-UP (OUTPATIENT)
Dept: INTERNAL MEDICINE | Age: 55
End: 2025-06-27

## 2025-06-27 DIAGNOSIS — Z12.31 ENCOUNTER FOR SCREENING MAMMOGRAM FOR MALIGNANT NEOPLASM OF BREAST: ICD-10-CM

## 2025-09-02 RX ORDER — FEXOFENADINE HYDROCHLORIDE AND PSEUDOEPHEDRINE HYDROCHLORIDE 180; 240 MG/1; MG/1
1 TABLET, FILM COATED, EXTENDED RELEASE ORAL DAILY
Qty: 90 TABLET | Refills: 0 | Status: SHIPPED | OUTPATIENT
Start: 2025-09-02